# Patient Record
Sex: MALE | Race: WHITE | Employment: OTHER | URBAN - METROPOLITAN AREA
[De-identification: names, ages, dates, MRNs, and addresses within clinical notes are randomized per-mention and may not be internally consistent; named-entity substitution may affect disease eponyms.]

---

## 2021-09-09 NOTE — PERIOP NOTES
PATIENT CALLED AND MADE AWARE OF COVID-19 TESTING NEEDED TO BE DONE WITHIN 96 HOURS OF SURGERY. PATIENT INSTRUCTED ON SELF QUARANTINE BETWEEN TESTING AND ARRIVAL TIME DAY OF SURGERY. PT ALSO INFORMED TO NOT USE ANY NASAL SPRAYS OR NASAL OINTMENTS PRIOR TO NASAL SWAB.     SPOKE W/ PT. HE WILL HAVE TEST DONE LOCAL TO HIS HOME AND WILL FAX RESULTS TO [facility]   (FAX NUMBERS PROVIDED)

## 2021-09-10 NOTE — H&P
Rashida Morse  : 1955   / Language: English / Race: White  Male      History of Present Illness   The patient is a 62year old male who presents to the practice today for a transition into care. Additional reasons for visit:    Hip Pain is described as the following: The onset of the hip pain has been gradual and has been occurring in a persistent pattern for 6 years. The course has been gradually worsening. The hip pain is described as a severe dull aching. The hip pain is described as being located in the hip (right). The pain is aggravated by walking, prolonged standing, bending and squatting. Relieving factors include rest, heat and elevation of leg. Note for \"Hip Pain\": Patient's chief complaint is right hip pain. Over the last several months he has had increasing symptoms. Last month he had a shot of cortisone which made his symptoms worse. X-rays show bone-on-bone osteoarthritis of the right hip. He is now having problems getting his shoes and socks on and doing his job. 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 Pain has worsened to the point that he is limping all the time. Referred in today for opinion regarding hip replacement. Allergies  No Known Allergies   [09/10/2020]:  No Known Drug Allergies   [09/10/2020]: Allergies Reconciled      Social History   Alcohol use   1-2 drinks per occasion, 2 times, Drinks beer, Drinks hard liquor, per year, Rarely drinks more than 5 drinks per occasion. Caffeine use   1-2 drinks per day, Carbonated beverages, Coffee, Tea. Current work status   Full-time. Exercise   Occasionally, walking. Marital status   . No drug use    Seat Belt Use   Always uses seat belts. Sun Exposure   Frequently. Tobacco / smoke exposure    Tobacco use   Former smoker, Smokes 2 packs of cigarettes per day. Medication History  Metoprolol Tartrate  (Oral) Specific strength unknown - Active.   Crestor  (Oral) Specific strength unknown - Active. Dilshad  (Oral) Specific strength unknown - Active. Ibuprofen  (200MG Tablet, Oral) Active. Aspirin Low Strength  (81MG Tablet Chewable, Oral) Active. Omeprazole  (10MG Capsule DR, Oral) Active. Medications Reconciled     Past Surgical History   No pertinent past surgical history          Review of Systems   General Not Present- Appetite Loss, Chills, Fatigue, Fever, HIV Exposure, Night Sweats, Persistent Infections, Seasonal Allergies, Weight Gain and Weight Loss. Skin Not Present- Bruising, Itching, Nail Changes, Pallor, Poor Wound Healing, Rash, Skin Color Changes, Suspicious Lesions and Yellowish Skin Color. HEENT Present- Ringing in the Ears. Not Present- Decreased Hearing, Double Vision, Earache, Hoarseness, Jaundice/Yellow Eyes, Loose Teeth, Nose Bleed and Sore Throat. Respiratory Not Present- Bloody sputum, Chronic Cough, Cough, Difficulty Breathing, Snoring, Wakes up from Sleep Wheezing or Short of Breath and Wheezing. Cardiovascular Not Present- Bluish Discoloration Of Lips Or Nails, Chest Pain, Difficulty Breathing Lying Down, Difficulty Breathing On Exertion, Fainting / Blacking Out, Leg Cramps With Exertion, Palpitations, Rapid Heart Rate and Swelling of Extremities. Gastrointestinal Not Present- Abdominal Pain, Black, Tarry Stool, Change in Bowel Habits, Cirrhosis, Constipation, Diarrhea, Difficulty Swallowing, Nausea and Vomiting. Male Genitourinary Not Present- Blood in Urine, Frequency, Painful Urination, Pelvic Pain, Trouble Starting Urinary Stream and Urgency. Musculoskeletal Present- Back Pain and Joint Pain. Not Present- Decreased Range of Motion, Joint Stiffness and Joint Swelling. Neurological Not Present- Dysesthesia, Fainting, Headaches, Memory Loss, Numbness, Paresthesias, Seizures, Tingling, Tremor, Unsteadiness, Weakness and Weakness In Extremities. Psychiatric Not Present- Anxiety, Bipolar and Depression.   Endocrine Not Present- Cold Intolerance, Excessive Hunger, Excessive Thirst, Excessive Urination and Heat Intolerance. Hematology Not Present- Abnormal Bleeding, Abnormal Bruising , Blood Clots, Enlarged Lymph Nodes, Excessive bleeding, Petechiae and Skin Discoloration. Vitals  Weight: 230 lb   Height: 73 in   Weight was reported by patient. Height was reported by patient. Body Surface Area: 2.28 m²   Body Mass Index: 30.34 kg/m²      Physical Exam  Musculoskeletal  Global Assessment  Examination of related systems reveals - well-developed, well-nourished, in no acute distress, alert and oriented x 3. Right Lower Extremity - Note: Patient's hip was examined. Severe pain on internal rotation. Leg lengths are equal. Logroll test impingement test both positive. Straight leg raise and femoral nerve stretch test are negative. Extremity is sensate and perfused with palpable dorsalis pedis pulse. Hip flexors, quads, ankle plantar flexors, ankle dorsiflexors have 5 out of 5 strength. Left Lower Extremity - Note: Patient's hip was examined. Impingement and apprehension signs are negative. Hip extends fully. Hip flexes to 100+ degrees. There is no trochanteric tenderness. Straight leg raise and femoral nerve stretch test are negative. Extremity is sensate and perfused with palpable dorsalis pedis pulse. Hip flexors, quads, ankle plantar flexors, ankle dorsiflexors have 5 out of 5 strength. Assessment & Plan     Primary osteoarthritis of right hip (715.15  M16.11)  Impression: Patient has end-stage DJD of his right hip. X-rays today revealed bone-on-bone with subchondral cyst formation and osteophyte formation. He has trialed a shot of cortisone which actually made him worse. At this point were going to schedule him for an direct anterior right total hip with 1 night stay in the hospital due to him being from out of town. All questions were answered.     Current Plans  Pt Education - How to Access Health Information Online using Patient Portal and 3rd Party Apps: discussed with patient and provided information. Pt Education - Educational materials were provided.: discussed with patient and provided information. Started Mobic 15 MG Oral Tablet, 1 (one) Tablet daily, #30, 09/10/2020, No Refill.     REVIEW OF SYSTEMS: Systems were reviewed by the provider.(V49.9)

## 2021-09-16 ENCOUNTER — ANESTHESIA EVENT (OUTPATIENT)
Dept: SURGERY | Age: 66
End: 2021-09-16
Payer: COMMERCIAL

## 2021-09-16 NOTE — PERIOP NOTES
CALLED TO FOLLOW UP ON COVID TEST RESULTS. LEFT  NOTIFYING THAT WE HAVE NOT RECEIVED COVID TEST RESULTS AND FOR HIM TO BRING THEM WITH HIM TOMORROW IF HE HAS THE RESULTS. 16:00 PATIENT CALLED BACK AND STATED HE HAS COPY OF COVID RESULTS AND HE WILL BRING THEM TOMORROW.

## 2021-09-17 ENCOUNTER — HOSPITAL ENCOUNTER (OUTPATIENT)
Age: 66
Discharge: HOME HEALTH | End: 2021-09-19
Attending: ORTHOPAEDIC SURGERY | Admitting: ORTHOPAEDIC SURGERY
Payer: COMMERCIAL

## 2021-09-17 ENCOUNTER — APPOINTMENT (OUTPATIENT)
Dept: GENERAL RADIOLOGY | Age: 66
End: 2021-09-17
Attending: ORTHOPAEDIC SURGERY
Payer: COMMERCIAL

## 2021-09-17 ENCOUNTER — ANESTHESIA (OUTPATIENT)
Dept: SURGERY | Age: 66
End: 2021-09-17
Payer: COMMERCIAL

## 2021-09-17 DIAGNOSIS — M16.11 PRIMARY LOCALIZED OSTEOARTHRITIS OF RIGHT HIP: Primary | ICD-10-CM

## 2021-09-17 LAB
ABO + RH BLD: NORMAL
BLOOD GROUP ANTIBODIES SERPL: NORMAL
GLUCOSE BLD STRIP.AUTO-MCNC: 118 MG/DL (ref 65–117)
GLUCOSE BLD STRIP.AUTO-MCNC: 128 MG/DL (ref 65–117)
GLUCOSE BLD STRIP.AUTO-MCNC: 137 MG/DL (ref 65–117)
SERVICE CMNT-IMP: ABNORMAL
SPECIMEN EXP DATE BLD: NORMAL

## 2021-09-17 PROCEDURE — 77030039267 HC ADH SKN EXOFIN S2SG -B: Performed by: ORTHOPAEDIC SURGERY

## 2021-09-17 PROCEDURE — 74011250636 HC RX REV CODE- 250/636: Performed by: PHYSICIAN ASSISTANT

## 2021-09-17 PROCEDURE — 73501 X-RAY EXAM HIP UNI 1 VIEW: CPT

## 2021-09-17 PROCEDURE — 74011000250 HC RX REV CODE- 250: Performed by: ORTHOPAEDIC SURGERY

## 2021-09-17 PROCEDURE — 77030006822 HC BLD SAW SAG BRSM -B: Performed by: ORTHOPAEDIC SURGERY

## 2021-09-17 PROCEDURE — C1776 JOINT DEVICE (IMPLANTABLE): HCPCS | Performed by: ORTHOPAEDIC SURGERY

## 2021-09-17 PROCEDURE — 74011000258 HC RX REV CODE- 258: Performed by: ORTHOPAEDIC SURGERY

## 2021-09-17 PROCEDURE — 74011250636 HC RX REV CODE- 250/636

## 2021-09-17 PROCEDURE — 76210000016 HC OR PH I REC 1 TO 1.5 HR: Performed by: ORTHOPAEDIC SURGERY

## 2021-09-17 PROCEDURE — 74011250637 HC RX REV CODE- 250/637: Performed by: PHYSICIAN ASSISTANT

## 2021-09-17 PROCEDURE — 2709999900 HC NON-CHARGEABLE SUPPLY: Performed by: ORTHOPAEDIC SURGERY

## 2021-09-17 PROCEDURE — 36415 COLL VENOUS BLD VENIPUNCTURE: CPT

## 2021-09-17 PROCEDURE — 77030026438 HC STYL ET INTUB CARD -A: Performed by: ANESTHESIOLOGY

## 2021-09-17 PROCEDURE — C9290 INJ, BUPIVACAINE LIPOSOME: HCPCS | Performed by: ORTHOPAEDIC SURGERY

## 2021-09-17 PROCEDURE — 74011250637 HC RX REV CODE- 250/637: Performed by: STUDENT IN AN ORGANIZED HEALTH CARE EDUCATION/TRAINING PROGRAM

## 2021-09-17 PROCEDURE — 76060000037 HC ANESTHESIA 3 TO 3.5 HR: Performed by: ORTHOPAEDIC SURGERY

## 2021-09-17 PROCEDURE — 77030008684 HC TU ET CUF COVD -B: Performed by: ANESTHESIOLOGY

## 2021-09-17 PROCEDURE — 77030002933 HC SUT MCRYL J&J -A: Performed by: ORTHOPAEDIC SURGERY

## 2021-09-17 PROCEDURE — 74011250637 HC RX REV CODE- 250/637: Performed by: ANESTHESIOLOGY

## 2021-09-17 PROCEDURE — 82962 GLUCOSE BLOOD TEST: CPT

## 2021-09-17 PROCEDURE — 74011000250 HC RX REV CODE- 250: Performed by: NURSE ANESTHETIST, CERTIFIED REGISTERED

## 2021-09-17 PROCEDURE — 74011250636 HC RX REV CODE- 250/636: Performed by: ORTHOPAEDIC SURGERY

## 2021-09-17 PROCEDURE — 74011000250 HC RX REV CODE- 250

## 2021-09-17 PROCEDURE — 74011250636 HC RX REV CODE- 250/636: Performed by: ANESTHESIOLOGY

## 2021-09-17 PROCEDURE — 77030031139 HC SUT VCRL2 J&J -A: Performed by: ORTHOPAEDIC SURGERY

## 2021-09-17 PROCEDURE — 76010000172 HC OR TIME 2.5 TO 3 HR INTENSV-TIER 1: Performed by: ORTHOPAEDIC SURGERY

## 2021-09-17 PROCEDURE — 77030020788: Performed by: ORTHOPAEDIC SURGERY

## 2021-09-17 PROCEDURE — 74011000250 HC RX REV CODE- 250: Performed by: PHYSICIAN ASSISTANT

## 2021-09-17 PROCEDURE — P9045 ALBUMIN (HUMAN), 5%, 250 ML: HCPCS

## 2021-09-17 PROCEDURE — 77030013079 HC BLNKT BAIR HGGR 3M -A: Performed by: ANESTHESIOLOGY

## 2021-09-17 PROCEDURE — 86901 BLOOD TYPING SEROLOGIC RH(D): CPT

## 2021-09-17 PROCEDURE — P9045 ALBUMIN (HUMAN), 5%, 250 ML: HCPCS | Performed by: NURSE ANESTHETIST, CERTIFIED REGISTERED

## 2021-09-17 PROCEDURE — 74011250636 HC RX REV CODE- 250/636: Performed by: NURSE ANESTHETIST, CERTIFIED REGISTERED

## 2021-09-17 DEVICE — BIOLOX DELTA CERAMIC FEMORAL HEAD +8.5 36MM DIA 12/14 TAPER
Type: IMPLANTABLE DEVICE | Site: HIP | Status: FUNCTIONAL
Brand: BIOLOX DELTA

## 2021-09-17 DEVICE — PINNACLE GRIPTION ACETABULAR SHELL SECTOR 56MM OD
Type: IMPLANTABLE DEVICE | Site: HIP | Status: FUNCTIONAL
Brand: PINNACLE GRIPTION

## 2021-09-17 DEVICE — APEX HOLE ELIMINATOR - PS
Type: IMPLANTABLE DEVICE | Site: HIP | Status: FUNCTIONAL
Brand: APEX

## 2021-09-17 DEVICE — ACTIS DUOFIX HIP PROSTHESIS (FEMORAL STEM 12/14 TAPER CEMENTLESS SIZE 8 HIGH COLLAR)  CE
Type: IMPLANTABLE DEVICE | Site: HIP | Status: FUNCTIONAL
Brand: ACTIS

## 2021-09-17 DEVICE — PINNACLE CANCELLOUS BONE SCREW 6.5MM X 50MM
Type: IMPLANTABLE DEVICE | Site: HIP | Status: FUNCTIONAL
Brand: PINNACLE

## 2021-09-17 DEVICE — PINNACLE CANCELLOUS BONE SCREW 6.5MM X 15MM
Type: IMPLANTABLE DEVICE | Site: HIP | Status: FUNCTIONAL
Brand: PINNACLE

## 2021-09-17 DEVICE — PINNACLE HIP SOLUTIONS ALTRX POLYETHYLENE ACETABULAR LINER NEUTRAL 36MM ID 56MM OD
Type: IMPLANTABLE DEVICE | Site: HIP | Status: FUNCTIONAL
Brand: PINNACLE ALTRX

## 2021-09-17 DEVICE — HIP H2 TOT ADV OTHER HD IMPL CAPPED SYNTHES: Type: IMPLANTABLE DEVICE | Status: FUNCTIONAL

## 2021-09-17 RX ORDER — ASPIRIN 81 MG/1
81 TABLET ORAL DAILY
Status: DISCONTINUED | OUTPATIENT
Start: 2021-09-18 | End: 2021-09-19 | Stop reason: HOSPADM

## 2021-09-17 RX ORDER — CALCIUM CARBONATE 200(500)MG
200 TABLET,CHEWABLE ORAL AS NEEDED
Status: DISCONTINUED | OUTPATIENT
Start: 2021-09-17 | End: 2021-09-19 | Stop reason: HOSPADM

## 2021-09-17 RX ORDER — FENTANYL CITRATE 50 UG/ML
25 INJECTION, SOLUTION INTRAMUSCULAR; INTRAVENOUS
Status: DISCONTINUED | OUTPATIENT
Start: 2021-09-17 | End: 2021-09-17 | Stop reason: HOSPADM

## 2021-09-17 RX ORDER — ROSUVASTATIN CALCIUM 10 MG/1
10 TABLET, COATED ORAL
Status: ON HOLD | COMMUNITY
End: 2021-09-17

## 2021-09-17 RX ORDER — METOPROLOL SUCCINATE 50 MG/1
100 TABLET, EXTENDED RELEASE ORAL DAILY
Status: DISCONTINUED | OUTPATIENT
Start: 2021-09-18 | End: 2021-09-18

## 2021-09-17 RX ORDER — AMOXICILLIN 250 MG
1 CAPSULE ORAL 2 TIMES DAILY
Status: DISCONTINUED | OUTPATIENT
Start: 2021-09-17 | End: 2021-09-19 | Stop reason: HOSPADM

## 2021-09-17 RX ORDER — DEXTROMETHORPHAN HYDROBROMIDE, GUAIFENESIN 5; 100 MG/5ML; MG/5ML
650 LIQUID ORAL EVERY 8 HOURS
COMMUNITY

## 2021-09-17 RX ORDER — AMLODIPINE AND OLMESARTAN MEDOXOMIL 10; 20 MG/1; MG/1
TABLET ORAL
Status: ON HOLD | COMMUNITY
End: 2021-09-17

## 2021-09-17 RX ORDER — AMOXICILLIN 250 MG
1 CAPSULE ORAL DAILY
Qty: 40 TABLET | Refills: 0 | Status: SHIPPED | OUTPATIENT
Start: 2021-09-17

## 2021-09-17 RX ORDER — ROCURONIUM BROMIDE 10 MG/ML
INJECTION, SOLUTION INTRAVENOUS AS NEEDED
Status: DISCONTINUED | OUTPATIENT
Start: 2021-09-17 | End: 2021-09-17 | Stop reason: HOSPADM

## 2021-09-17 RX ORDER — PROPOFOL 10 MG/ML
INJECTION, EMULSION INTRAVENOUS AS NEEDED
Status: DISCONTINUED | OUTPATIENT
Start: 2021-09-17 | End: 2021-09-17 | Stop reason: HOSPADM

## 2021-09-17 RX ORDER — NALOXONE HYDROCHLORIDE 4 MG/.1ML
SPRAY NASAL
Qty: 1 EACH | Refills: 0 | Status: SHIPPED | OUTPATIENT
Start: 2021-09-17

## 2021-09-17 RX ORDER — POLYETHYLENE GLYCOL 3350 17 G/17G
17 POWDER, FOR SOLUTION ORAL DAILY
Status: DISCONTINUED | OUTPATIENT
Start: 2021-09-18 | End: 2021-09-19 | Stop reason: HOSPADM

## 2021-09-17 RX ORDER — METOPROLOL SUCCINATE 100 MG/1
100 TABLET, EXTENDED RELEASE ORAL DAILY
COMMUNITY

## 2021-09-17 RX ORDER — LORAZEPAM 0.5 MG/1
0.5 TABLET ORAL
Status: DISCONTINUED | OUTPATIENT
Start: 2021-09-17 | End: 2021-09-19 | Stop reason: HOSPADM

## 2021-09-17 RX ORDER — METOPROLOL TARTRATE 25 MG/1
TABLET, FILM COATED ORAL 2 TIMES DAILY
Status: ON HOLD | COMMUNITY
End: 2021-09-17

## 2021-09-17 RX ORDER — SUCCINYLCHOLINE CHLORIDE 20 MG/ML
INJECTION INTRAMUSCULAR; INTRAVENOUS AS NEEDED
Status: DISCONTINUED | OUTPATIENT
Start: 2021-09-17 | End: 2021-09-17 | Stop reason: HOSPADM

## 2021-09-17 RX ORDER — LOSARTAN POTASSIUM 50 MG/1
100 TABLET ORAL DAILY
Status: DISCONTINUED | OUTPATIENT
Start: 2021-09-18 | End: 2021-09-19 | Stop reason: HOSPADM

## 2021-09-17 RX ORDER — MIDAZOLAM HYDROCHLORIDE 1 MG/ML
1 INJECTION, SOLUTION INTRAMUSCULAR; INTRAVENOUS AS NEEDED
Status: DISCONTINUED | OUTPATIENT
Start: 2021-09-17 | End: 2021-09-17 | Stop reason: HOSPADM

## 2021-09-17 RX ORDER — HYDROMORPHONE HYDROCHLORIDE 1 MG/ML
0.5 INJECTION, SOLUTION INTRAMUSCULAR; INTRAVENOUS; SUBCUTANEOUS
Status: ACTIVE | OUTPATIENT
Start: 2021-09-17 | End: 2021-09-18

## 2021-09-17 RX ORDER — SODIUM CHLORIDE, SODIUM LACTATE, POTASSIUM CHLORIDE, CALCIUM CHLORIDE 600; 310; 30; 20 MG/100ML; MG/100ML; MG/100ML; MG/100ML
50 INJECTION, SOLUTION INTRAVENOUS CONTINUOUS
Status: DISCONTINUED | OUTPATIENT
Start: 2021-09-17 | End: 2021-09-17 | Stop reason: HOSPADM

## 2021-09-17 RX ORDER — HYDROMORPHONE HYDROCHLORIDE 2 MG/ML
INJECTION, SOLUTION INTRAMUSCULAR; INTRAVENOUS; SUBCUTANEOUS AS NEEDED
Status: DISCONTINUED | OUTPATIENT
Start: 2021-09-17 | End: 2021-09-17 | Stop reason: HOSPADM

## 2021-09-17 RX ORDER — CLOPIDOGREL BISULFATE 75 MG/1
TABLET ORAL
COMMUNITY

## 2021-09-17 RX ORDER — HYDROMORPHONE HYDROCHLORIDE 1 MG/ML
0.2 INJECTION, SOLUTION INTRAMUSCULAR; INTRAVENOUS; SUBCUTANEOUS
Status: DISCONTINUED | OUTPATIENT
Start: 2021-09-17 | End: 2021-09-17 | Stop reason: HOSPADM

## 2021-09-17 RX ORDER — ONDANSETRON 2 MG/ML
4 INJECTION INTRAMUSCULAR; INTRAVENOUS AS NEEDED
Status: DISCONTINUED | OUTPATIENT
Start: 2021-09-17 | End: 2021-09-17 | Stop reason: HOSPADM

## 2021-09-17 RX ORDER — ALBUMIN HUMAN 50 G/1000ML
12.5 SOLUTION INTRAVENOUS ONCE
Status: COMPLETED | OUTPATIENT
Start: 2021-09-17 | End: 2021-09-17

## 2021-09-17 RX ORDER — BISMUTH SUBSALICYLATE 262 MG
1 TABLET,CHEWABLE ORAL DAILY
COMMUNITY

## 2021-09-17 RX ORDER — NALOXONE HYDROCHLORIDE 0.4 MG/ML
0.4 INJECTION, SOLUTION INTRAMUSCULAR; INTRAVENOUS; SUBCUTANEOUS AS NEEDED
Status: DISCONTINUED | OUTPATIENT
Start: 2021-09-17 | End: 2021-09-19 | Stop reason: HOSPADM

## 2021-09-17 RX ORDER — EPHEDRINE SULFATE/0.9% NACL/PF 50 MG/5 ML
SYRINGE (ML) INTRAVENOUS AS NEEDED
Status: DISCONTINUED | OUTPATIENT
Start: 2021-09-17 | End: 2021-09-17 | Stop reason: HOSPADM

## 2021-09-17 RX ORDER — FENTANYL CITRATE 50 UG/ML
INJECTION, SOLUTION INTRAMUSCULAR; INTRAVENOUS AS NEEDED
Status: DISCONTINUED | OUTPATIENT
Start: 2021-09-17 | End: 2021-09-17 | Stop reason: HOSPADM

## 2021-09-17 RX ORDER — SODIUM CHLORIDE 0.9 % (FLUSH) 0.9 %
5-40 SYRINGE (ML) INJECTION EVERY 8 HOURS
Status: DISCONTINUED | OUTPATIENT
Start: 2021-09-17 | End: 2021-09-19 | Stop reason: HOSPADM

## 2021-09-17 RX ORDER — ASPIRIN 81 MG/1
81 TABLET ORAL DAILY
COMMUNITY

## 2021-09-17 RX ORDER — FAMOTIDINE 20 MG/1
10 TABLET, FILM COATED ORAL 2 TIMES DAILY
Status: DISCONTINUED | OUTPATIENT
Start: 2021-09-17 | End: 2021-09-19 | Stop reason: HOSPADM

## 2021-09-17 RX ORDER — OXYCODONE HYDROCHLORIDE 5 MG/1
5 TABLET ORAL
Qty: 42 TABLET | Refills: 0 | Status: SHIPPED | OUTPATIENT
Start: 2021-09-17 | End: 2021-09-24

## 2021-09-17 RX ORDER — HYDROCHLOROTHIAZIDE 25 MG/1
12.5 TABLET ORAL DAILY
Status: DISCONTINUED | OUTPATIENT
Start: 2021-09-18 | End: 2021-09-19 | Stop reason: HOSPADM

## 2021-09-17 RX ORDER — FAMOTIDINE 10 MG/1
10 TABLET ORAL 2 TIMES DAILY
COMMUNITY

## 2021-09-17 RX ORDER — HYDROXYZINE HYDROCHLORIDE 10 MG/1
10 TABLET, FILM COATED ORAL
Status: DISCONTINUED | OUTPATIENT
Start: 2021-09-17 | End: 2021-09-19 | Stop reason: HOSPADM

## 2021-09-17 RX ORDER — ACETAMINOPHEN 325 MG/1
650 TABLET ORAL ONCE
Status: COMPLETED | OUTPATIENT
Start: 2021-09-17 | End: 2021-09-17

## 2021-09-17 RX ORDER — CLOPIDOGREL BISULFATE 75 MG/1
75 TABLET ORAL DAILY
Status: DISCONTINUED | OUTPATIENT
Start: 2021-09-18 | End: 2021-09-19 | Stop reason: HOSPADM

## 2021-09-17 RX ORDER — AMLODIPINE BESYLATE 5 MG/1
10 TABLET ORAL DAILY
Status: DISCONTINUED | OUTPATIENT
Start: 2021-09-18 | End: 2021-09-19 | Stop reason: HOSPADM

## 2021-09-17 RX ORDER — EPHEDRINE SULFATE/0.9% NACL/PF 50 MG/5 ML
SYRINGE (ML) INTRAVENOUS
Status: COMPLETED
Start: 2021-09-17 | End: 2021-09-17

## 2021-09-17 RX ORDER — ROSUVASTATIN CALCIUM 40 MG/1
40 TABLET, COATED ORAL
Status: DISCONTINUED | OUTPATIENT
Start: 2021-09-17 | End: 2021-09-19 | Stop reason: HOSPADM

## 2021-09-17 RX ORDER — LIDOCAINE HYDROCHLORIDE 20 MG/ML
INJECTION, SOLUTION EPIDURAL; INFILTRATION; INTRACAUDAL; PERINEURAL AS NEEDED
Status: DISCONTINUED | OUTPATIENT
Start: 2021-09-17 | End: 2021-09-17 | Stop reason: HOSPADM

## 2021-09-17 RX ORDER — DEXAMETHASONE SODIUM PHOSPHATE 4 MG/ML
INJECTION, SOLUTION INTRA-ARTICULAR; INTRALESIONAL; INTRAMUSCULAR; INTRAVENOUS; SOFT TISSUE AS NEEDED
Status: DISCONTINUED | OUTPATIENT
Start: 2021-09-17 | End: 2021-09-17 | Stop reason: HOSPADM

## 2021-09-17 RX ORDER — ACETAMINOPHEN 500 MG
1000 TABLET ORAL EVERY 6 HOURS
Status: DISCONTINUED | OUTPATIENT
Start: 2021-09-17 | End: 2021-09-19 | Stop reason: HOSPADM

## 2021-09-17 RX ORDER — HYDROCHLOROTHIAZIDE 12.5 MG/1
12.5 CAPSULE ORAL DAILY
COMMUNITY

## 2021-09-17 RX ORDER — FACIAL-BODY WIPES
10 EACH TOPICAL DAILY PRN
Status: DISCONTINUED | OUTPATIENT
Start: 2021-09-19 | End: 2021-09-19 | Stop reason: HOSPADM

## 2021-09-17 RX ORDER — METAXALONE 400 MG/1
TABLET ORAL
COMMUNITY

## 2021-09-17 RX ORDER — GLUCOSAMINE SULFATE 1500 MG
POWDER IN PACKET (EA) ORAL DAILY
COMMUNITY

## 2021-09-17 RX ORDER — ALBUMIN HUMAN 50 G/1000ML
SOLUTION INTRAVENOUS AS NEEDED
Status: DISCONTINUED | OUTPATIENT
Start: 2021-09-17 | End: 2021-09-17 | Stop reason: HOSPADM

## 2021-09-17 RX ORDER — FENTANYL CITRATE 50 UG/ML
50 INJECTION, SOLUTION INTRAMUSCULAR; INTRAVENOUS AS NEEDED
Status: DISCONTINUED | OUTPATIENT
Start: 2021-09-17 | End: 2021-09-17 | Stop reason: HOSPADM

## 2021-09-17 RX ORDER — EPHEDRINE SULFATE/0.9% NACL/PF 50 MG/5 ML
10 SYRINGE (ML) INTRAVENOUS ONCE
Status: COMPLETED | OUTPATIENT
Start: 2021-09-17 | End: 2021-09-17

## 2021-09-17 RX ORDER — LORAZEPAM 0.5 MG/1
TABLET ORAL
COMMUNITY

## 2021-09-17 RX ORDER — ALBUMIN HUMAN 50 G/1000ML
SOLUTION INTRAVENOUS
Status: COMPLETED
Start: 2021-09-17 | End: 2021-09-17

## 2021-09-17 RX ORDER — ONDANSETRON 2 MG/ML
4 INJECTION INTRAMUSCULAR; INTRAVENOUS
Status: DISPENSED | OUTPATIENT
Start: 2021-09-17 | End: 2021-09-19

## 2021-09-17 RX ORDER — ONDANSETRON 2 MG/ML
INJECTION INTRAMUSCULAR; INTRAVENOUS AS NEEDED
Status: DISCONTINUED | OUTPATIENT
Start: 2021-09-17 | End: 2021-09-17 | Stop reason: HOSPADM

## 2021-09-17 RX ORDER — SODIUM CHLORIDE 0.9 % (FLUSH) 0.9 %
5-40 SYRINGE (ML) INJECTION EVERY 8 HOURS
Status: DISCONTINUED | OUTPATIENT
Start: 2021-09-17 | End: 2021-09-17 | Stop reason: HOSPADM

## 2021-09-17 RX ORDER — SODIUM CHLORIDE 0.9 % (FLUSH) 0.9 %
5-40 SYRINGE (ML) INJECTION AS NEEDED
Status: DISCONTINUED | OUTPATIENT
Start: 2021-09-17 | End: 2021-09-19 | Stop reason: HOSPADM

## 2021-09-17 RX ORDER — CHOLECALCIFEROL (VITAMIN D3) 50 MCG
CAPSULE ORAL
COMMUNITY

## 2021-09-17 RX ORDER — OXYCODONE HYDROCHLORIDE 5 MG/1
10 TABLET ORAL
Status: DISCONTINUED | OUTPATIENT
Start: 2021-09-17 | End: 2021-09-19 | Stop reason: HOSPADM

## 2021-09-17 RX ORDER — PHENYLEPHRINE HCL IN 0.9% NACL 0.4MG/10ML
SYRINGE (ML) INTRAVENOUS AS NEEDED
Status: DISCONTINUED | OUTPATIENT
Start: 2021-09-17 | End: 2021-09-17 | Stop reason: HOSPADM

## 2021-09-17 RX ORDER — OXYCODONE HYDROCHLORIDE 5 MG/1
5 TABLET ORAL
Status: DISCONTINUED | OUTPATIENT
Start: 2021-09-17 | End: 2021-09-19 | Stop reason: HOSPADM

## 2021-09-17 RX ORDER — ROSUVASTATIN CALCIUM 40 MG/1
40 TABLET, COATED ORAL
COMMUNITY

## 2021-09-17 RX ORDER — PROPOFOL 10 MG/ML
INJECTION, EMULSION INTRAVENOUS
Status: DISCONTINUED | OUTPATIENT
Start: 2021-09-17 | End: 2021-09-17 | Stop reason: HOSPADM

## 2021-09-17 RX ORDER — SODIUM CHLORIDE 0.9 % (FLUSH) 0.9 %
5-40 SYRINGE (ML) INJECTION AS NEEDED
Status: DISCONTINUED | OUTPATIENT
Start: 2021-09-17 | End: 2021-09-17 | Stop reason: HOSPADM

## 2021-09-17 RX ORDER — TRANEXAMIC ACID 100 MG/ML
INJECTION, SOLUTION INTRAVENOUS AS NEEDED
Status: DISCONTINUED | OUTPATIENT
Start: 2021-09-17 | End: 2021-09-17 | Stop reason: HOSPADM

## 2021-09-17 RX ORDER — MIDAZOLAM HYDROCHLORIDE 1 MG/ML
INJECTION, SOLUTION INTRAMUSCULAR; INTRAVENOUS AS NEEDED
Status: DISCONTINUED | OUTPATIENT
Start: 2021-09-17 | End: 2021-09-17 | Stop reason: HOSPADM

## 2021-09-17 RX ORDER — AMLODIPINE AND OLMESARTAN MEDOXOMIL 10; 40 MG/1; MG/1
1 TABLET ORAL DAILY
COMMUNITY

## 2021-09-17 RX ORDER — LIDOCAINE HYDROCHLORIDE 10 MG/ML
0.1 INJECTION, SOLUTION EPIDURAL; INFILTRATION; INTRACAUDAL; PERINEURAL AS NEEDED
Status: DISCONTINUED | OUTPATIENT
Start: 2021-09-17 | End: 2021-09-17 | Stop reason: HOSPADM

## 2021-09-17 RX ORDER — SODIUM CHLORIDE 9 MG/ML
125 INJECTION, SOLUTION INTRAVENOUS CONTINUOUS
Status: DISPENSED | OUTPATIENT
Start: 2021-09-17 | End: 2021-09-18

## 2021-09-17 RX ADMIN — MIDAZOLAM HYDROCHLORIDE 2 MG: 1 INJECTION, SOLUTION INTRAMUSCULAR; INTRAVENOUS at 08:50

## 2021-09-17 RX ADMIN — PROPOFOL 100 MG: 10 INJECTION, EMULSION INTRAVENOUS at 08:57

## 2021-09-17 RX ADMIN — ALBUMIN (HUMAN) 250 ML: 12.5 INJECTION, SOLUTION INTRAVENOUS at 10:14

## 2021-09-17 RX ADMIN — ALBUMIN (HUMAN) 12.5 G: 12.5 INJECTION, SOLUTION INTRAVENOUS at 12:40

## 2021-09-17 RX ADMIN — SODIUM CHLORIDE, POTASSIUM CHLORIDE, SODIUM LACTATE AND CALCIUM CHLORIDE 50 ML/HR: 600; 310; 30; 20 INJECTION, SOLUTION INTRAVENOUS at 07:43

## 2021-09-17 RX ADMIN — DEXAMETHASONE SODIUM PHOSPHATE 4 MG: 4 INJECTION, SOLUTION INTRAMUSCULAR; INTRAVENOUS at 09:43

## 2021-09-17 RX ADMIN — HYDROMORPHONE HYDROCHLORIDE 0.5 MG: 2 INJECTION, SOLUTION INTRAMUSCULAR; INTRAVENOUS; SUBCUTANEOUS at 11:17

## 2021-09-17 RX ADMIN — SODIUM CHLORIDE 125 ML/HR: 900 INJECTION, SOLUTION INTRAVENOUS at 12:30

## 2021-09-17 RX ADMIN — ACETAMINOPHEN 650 MG: 325 TABLET ORAL at 08:05

## 2021-09-17 RX ADMIN — SUGAMMADEX 210 MG: 100 INJECTION, SOLUTION INTRAVENOUS at 11:36

## 2021-09-17 RX ADMIN — Medication 10 MG: at 12:36

## 2021-09-17 RX ADMIN — ONDANSETRON 4 MG: 2 INJECTION INTRAMUSCULAR; INTRAVENOUS at 15:18

## 2021-09-17 RX ADMIN — HYDROMORPHONE HYDROCHLORIDE 0.5 MG: 2 INJECTION, SOLUTION INTRAMUSCULAR; INTRAVENOUS; SUBCUTANEOUS at 11:07

## 2021-09-17 RX ADMIN — LIDOCAINE HYDROCHLORIDE 80 MG: 20 INJECTION, SOLUTION EPIDURAL; INFILTRATION; INTRACAUDAL; PERINEURAL at 08:57

## 2021-09-17 RX ADMIN — ONDANSETRON HYDROCHLORIDE 4 MG: 2 INJECTION, SOLUTION INTRAMUSCULAR; INTRAVENOUS at 11:23

## 2021-09-17 RX ADMIN — ALBUMIN HUMAN 12.5 G: 50 SOLUTION INTRAVENOUS at 12:40

## 2021-09-17 RX ADMIN — HYDROMORPHONE HYDROCHLORIDE 0.5 MG: 2 INJECTION, SOLUTION INTRAMUSCULAR; INTRAVENOUS; SUBCUTANEOUS at 11:51

## 2021-09-17 RX ADMIN — ONDANSETRON HYDROCHLORIDE 4 MG: 2 INJECTION, SOLUTION INTRAMUSCULAR; INTRAVENOUS at 09:43

## 2021-09-17 RX ADMIN — PROPOFOL 25 MCG/KG/MIN: 10 INJECTION, EMULSION INTRAVENOUS at 09:37

## 2021-09-17 RX ADMIN — Medication 80 MCG: at 09:10

## 2021-09-17 RX ADMIN — WATER 2 G: 1 INJECTION INTRAMUSCULAR; INTRAVENOUS; SUBCUTANEOUS at 09:04

## 2021-09-17 RX ADMIN — SENNOSIDES AND DOCUSATE SODIUM 1 TABLET: 50; 8.6 TABLET ORAL at 17:27

## 2021-09-17 RX ADMIN — FENTANYL CITRATE 100 MCG: 50 INJECTION, SOLUTION INTRAMUSCULAR; INTRAVENOUS at 08:57

## 2021-09-17 RX ADMIN — HYDROMORPHONE HYDROCHLORIDE 0.2 MG: 1 INJECTION, SOLUTION INTRAMUSCULAR; INTRAVENOUS; SUBCUTANEOUS at 13:05

## 2021-09-17 RX ADMIN — HYDROMORPHONE HYDROCHLORIDE 0.2 MG: 1 INJECTION, SOLUTION INTRAMUSCULAR; INTRAVENOUS; SUBCUTANEOUS at 12:50

## 2021-09-17 RX ADMIN — MIDAZOLAM HYDROCHLORIDE 1 MG: 1 INJECTION, SOLUTION INTRAMUSCULAR; INTRAVENOUS at 08:53

## 2021-09-17 RX ADMIN — HYDROMORPHONE HYDROCHLORIDE 0.2 MG: 1 INJECTION, SOLUTION INTRAMUSCULAR; INTRAVENOUS; SUBCUTANEOUS at 12:30

## 2021-09-17 RX ADMIN — ROCURONIUM BROMIDE 45 MG: 10 SOLUTION INTRAVENOUS at 09:03

## 2021-09-17 RX ADMIN — ROCURONIUM BROMIDE 20 MG: 10 SOLUTION INTRAVENOUS at 09:53

## 2021-09-17 RX ADMIN — ACETAMINOPHEN 1000 MG: 500 TABLET ORAL at 14:44

## 2021-09-17 RX ADMIN — FAMOTIDINE 10 MG: 20 TABLET, FILM COATED ORAL at 17:27

## 2021-09-17 RX ADMIN — CALCIUM CARBONATE (ANTACID) CHEW TAB 500 MG 200 MG: 500 CHEW TAB at 19:38

## 2021-09-17 RX ADMIN — HYDROMORPHONE HYDROCHLORIDE 0.2 MG: 1 INJECTION, SOLUTION INTRAMUSCULAR; INTRAVENOUS; SUBCUTANEOUS at 12:40

## 2021-09-17 RX ADMIN — ROSUVASTATIN 40 MG: 40 TABLET, FILM COATED ORAL at 21:30

## 2021-09-17 RX ADMIN — ROCURONIUM BROMIDE 5 MG: 10 SOLUTION INTRAVENOUS at 08:57

## 2021-09-17 RX ADMIN — SUCCINYLCHOLINE CHLORIDE 160 MG: 20 INJECTION, SOLUTION INTRAMUSCULAR; INTRAVENOUS at 08:57

## 2021-09-17 RX ADMIN — Medication 10 MG: at 09:10

## 2021-09-17 RX ADMIN — MIDAZOLAM HYDROCHLORIDE 2 MG: 1 INJECTION, SOLUTION INTRAMUSCULAR; INTRAVENOUS at 08:48

## 2021-09-17 RX ADMIN — CEFAZOLIN SODIUM 2 G: 1 INJECTION, POWDER, FOR SOLUTION INTRAMUSCULAR; INTRAVENOUS at 17:26

## 2021-09-17 RX ADMIN — PHENYLEPHRINE HYDROCHLORIDE 80 MCG/MIN: 10 INJECTION INTRAVENOUS at 09:10

## 2021-09-17 RX ADMIN — ROCURONIUM BROMIDE 15 MG: 10 SOLUTION INTRAVENOUS at 10:30

## 2021-09-17 NOTE — OP NOTES
OPERATIVE REPORT  RIGHT TOTAL HIP REPLACEMENT (ANTERIOR APPROACH)    NAME: Alina Manzo  MRN: [id number]  :  1955  AGE: 60 y.o. DATE OF SURGERY:  2021    PREOPERATIVE DIAGNOSIS: Severe degenerative joint disease, right hip. POSTOPERATIVE DIAGNOSIS: Severe degenerative joint disease, right hip. PROCEDURES PERFORMED: Right total hip replacement - Anterior approach    SURGEON: Darwin Mejia MD.    ASSISTANT: Joshua Broussard PA-C    ANESTHESIA: General    ESTIMATED BLOOD LOSS: 125 mL. DRAINS: None. COMPLICATIONS: None. SPECIMENS REMOVED: None. PRE-OP ANTIBIOTIC: Ancef 2 gram    IMPLANT:   Implant Name Type Inv. Item Serial No.  Lot No. LRB No. Used Action   ELIMINATOR H APEX FOR 48-60MM PINN HIP SHELL - SNA  ELIMINATOR H APEX FOR 48-60MM PINN HIP SHELL NA [facility] [id number] Right 1 Implanted   CUP ACET [id number] HIP GRIPTION DONTA CEMENTLESS FIX SECT SER - SNA  CUP ACET [id number] HIP GRIPTION DONTA CEMENTLESS FIX SECT SER NA [facility] [id number] Right 1 Implanted   LINER ACET OD56MM ID36MM HIP ALTRX PINN - SNA  LINER ACET OD56MM ID36MM HIP ALTRX PINN NA [facility] [id number] Right 1 Implanted   SCREW BNE L50MM DIA6.5MM CANC HIP DOME PINN - SNA  SCREW BNE L50MM DIA6.5MM CANC HIP DOME PINN NA [facility] [id number] Right 1 Implanted   SCR ACET CANC PINN 6.5X15MM SS --  - SNA  SCR ACET CANC PINN 6.5X15MM SS --  NA [facility] [employer] [id number] Right 1 Implanted   STEM FEM SZ 8 L111MM 12/14 TAPR HI OFFSET HIP DUOFIX CLLRD - SNA  STEM FEM SZ 8 L111MM 12/14 TAPR HI OFFSET HIP DUOFIX CLLRD NA [facility] [id number] Right 1 Implanted   HEAD FEM [id number] +8MM OFFSET 12/14 TAPR HIP CERAMIC BIOLOX - SNA  HEAD FEM [id number] +8MM OFFSET 12/14 TAPR HIP CERAMIC BIOLOX NA [facility] [id number] Right 1 Implanted       INDICATIONS: 60 yrs male  with severe DJD of the right hip.   The patient's right hip has been progressive in terms of symptoms. The patient now has severe activity limitation. The patient has continued with conservative management without adequate relief or improvement of functional limitations. We discussed options and he wished to proceed with right total hip replacement. The patient has continued with conservative management without adequate relief or improvement of functional limitations. DESCRIPTION OF PROCEDURE: Anesthetic was initiated. Preoperative dose of IV Ancef was given. Mccann catheter was not placed. The right side was confirmed as the operative side, prepped and draped in the usual sterile fashion. Skin was covered with Ioban occlusive dressing. Direct anterior exposure was made to the patient's hip through the sartorius tensor interval. Anterior hip vasculature was cauterized. Retractors were taken out to observe for bleeding and there was none. The capsule was identified, opened and T'd distally. The femoral neck was osteotomized. Femoral head was removed from the acetabulum, which was exposed and soft tissues were removed. The acetabulum was progressively reamed to 55 and a 56 trial shell was impacted with good press-fit. This was removed and a 56 shell was impacted in the acetabulum in 40 degrees of abduction in an anatomic-type anteversion. Bone spurs were removed and 6.5 screws x2 were placed. The polyethylene liner was placed. Femur was positioned and elevated from the wound. The medullary canal was entered. Flexible reamers were not utilized as the patient did not have a narrowed femoral canal, broached to a size 8Hi. Calcar planed and then trialed. A 36 +8.5 hip ball was the most appropriate for leg length and tension with a standard offset stem. The hip was dislocated. The anterior greater trochanter was trimmed down to enhance flexion, rotation and stability. The trial was removed and the real stem was impacted. The real hip ball was placed.  The hip was reduced. After copious irrigation, the capsule was closed with #2 Vicryl sutures. I irrigated the skin, subcutaneous and deep wound. I closed the fascia of the tensor fascia farheen with #2 Vicryl sutures. Skin and subcutaneous were irrigated. Soft tissues were infiltrated with local anesthetic. Skin and subcutaneous were closed in a standard fashion. Sterile dressing was applied. There were no complications. No specimen was sent. The procedure was a RIGHT TOTAL HIP REPLACEMENT using a Depuy total hip construct. The patient was transferred to the recovery room in stable condition. Marina Sutherland PA-C was critical throughout the case to assist with positioning, retraction and closure. There were no other available residents or surgical assistants to assist during this procedure.     Emilie Gonzalez MD

## 2021-09-17 NOTE — ANESTHESIA PREPROCEDURE EVALUATION
Relevant Problems   No relevant active problems       Anesthetic History   No history of anesthetic complications            Review of Systems / Medical History  Patient summary reviewed, nursing notes reviewed and pertinent labs reviewed    Pulmonary  Within defined limits                 Neuro/Psych   Within defined limits           Cardiovascular              CAD    Exercise tolerance: >4 METS     GI/Hepatic/Renal  Within defined limits              Endo/Other        Arthritis     Other Findings              Physical Exam    Airway  Mallampati: II  TM Distance: 4 - 6 cm  Neck ROM: normal range of motion   Mouth opening: Normal     Cardiovascular    Rhythm: regular  Rate: normal         Dental  No notable dental hx       Pulmonary  Breath sounds clear to auscultation               Abdominal  Abdominal exam normal       Other Findings            Anesthetic Plan    ASA: 2  Anesthesia type: general          Induction: Intravenous  Anesthetic plan and risks discussed with: Patient

## 2021-09-17 NOTE — PROGRESS NOTES
Physical Therapy Screening:  Services are indicated and therapy order is required s/p surgery. An InBasket screening referral was triggered for physical therapy based on results obtained during the nursing admission assessment. The patients chart was reviewed and the patient is appropriate for a skilled therapy evaluation. Please order a consult for physical therapy if you are in agreement and would like an evaluation to be completed. Thank you.     Pallavi Chua, PT

## 2021-09-17 NOTE — H&P
Date of Surgery Update:  India Blancas was seen and examined. History and physical has been reviewed. The patient has been examined.  There have been no significant clinical changes since the completion of the originally dated History and Physical.    Signed By: Daljit Almazan MD     September 17, 2021 7:34 AM

## 2021-09-17 NOTE — PROGRESS NOTES
09/17/21 1230   Vital Signs   Pulse (Heart Rate) 66   Resp Rate 10   BP (!) 86/65   Oxygen Therapy   O2 Sat (%) 100 %   Pulse via Oximetry 66 beats per minute       Dr Jessica De La Cruz notified of SBP in 80's to 90's. Other VSS. Ephedrine and albumin ordered.  See MAR and flow sheet for follow up

## 2021-09-17 NOTE — DISCHARGE INSTRUCTIONS
Discharge Instructions Hip Replacement  Dr. Stacia Boykin      Patient Name  Azael Maxwell  Date of procedure  9/17/2021    Procedure  Procedure(s):  RIGHT TOTAL HIP ARTHROPLASTY ANTERIOR APPROACH  Surgeon  Surgeon(s) and Role:     * Alley Riley MD - Primary  Date of discharge: No discharge date for patient encounter. PCP: Bishnu Barber MD    Follow up care   Follow up visit with Dr. Stacia Boykin in 3-4 weeks. Call [phone]  to make an appointment    Activity at home   AVOID sudden and extreme movement of your hip (surgical leg)   Take a short walk every hour; except at night when sleeping   Do your Home Exercise Program 3 times every day    After exercising lie down and elevate your leg on pillows for 15-30 minutes to decrease swelling   Refer to your patient notebook for more information    Bathing and caring for your incision   You may take a shower with your waterproof dressing on your hip.  The waterproof dressing is to stay on your hip for 7 days.  On the 7th day have someone gently peel the dressing off by lifting the edge and stretching it to break the seal.   You may then leave your incision open to air unless you see drainage from your hip.  If you have staple closing your incision they will be removed by home health 14 days after your surgery. Preventing blood clots   Take Plavix 75 mg and aspirin 81 mg every day as prescribed as you normally do.  Call Dr. Stacia Boykin if you have side effects of blood thinning medication: bleeding, bruising, upset stomach, or diarrhea.  Call Dr. Stacia Boykin for signs of a blood clot in your leg: calf pain, tenderness, redness, swelling of lower leg    Preventing lung congestion   Use your incentive spirometer 4 times a day; do 10 repetitions each time   Remember to keep the small blue ball between the two arrows when taking a slow, deep breath           Pain Management   Get up and walk a short distance to relieve pain and stiffness.    Place ice wrap on your hip except when you are walking. The gel ice packs should be changed about every 4 hours   Elevate your leg on pillows for 15-30 minutes    Take Tylenol 650mg (take two 325mg tablets) every 6 hours for pain.  If needed, take a narcotic pain pill every 4-6 hours as prescribed.  Take all medications with a small amount of food.  As your pain decreases, take the narcotics less often or take ½ of a pill   Call Dr. Taina Ayala if you have side effects from your narcotic pain medication: itching, drowsiness, dizziness, upset stomach, dry mouth, constipation or if you medication is not relieving your pain. Diet after surgery   You may resume your normal diet. Include vegetables, fruit, whole grains, lean meats, and low-fat dairy products. Eat food high in fiber    Drink plenty of fluids, including 8 cups of water daily   Take Senokot-s twice a day to prevent constipation    Avoid after surgery   Do not take any over-the-counter medication for pain except Tylenol   Do not take more than 3000mg (3 grams) of Tylenol in 24 hours.  Do not drink alcoholic beverages   Do not smoke   Do not drive until seen for follow up appointment   Do not place frozen gel pack directly on your skin. It can cause frostbite.  Do not take a tub bath, swim or get in a hot tub for 6 weeks  Prevention of falls and safety at home   Set up an area where you can rest comfortably leaving space around furniture to allow you to walk with your walker   Keep stairs, hallways and bathrooms well lit; especially at night   Arrange for care for your pets   Keep your home free of clutter        Call Dr. Taina Ayala at [phone] for:   Pain that is not relieved by pain medication, ice and activity   Side effects of medications   Increased/spread of bruising   Warning signs of infection:  ? persistent fever greater than 100 degrees  ?  shaking or chills  ? increased redness, tenderness, swelling or drainage from incision  ? increased pain during activity or rest   Warning signs of a blood clot in your leg:  ? increased pain in your calf  ? tenderness or redness  ? increased swelling or knee, calf, ankle or foot    Call [phone] after 5pm or on a weekend.  The on call physician will return your phone call      Call your Primary Care Doctor for:    Concerns about your medical conditions such as diabetes, high blood pressure, asthma, congestive heart failure   Blood sugars greater than 180   Persistent headache or dizziness   Coughing or congestion   Constipation or diarrhea   Burning when you go to the bathroom   Abnormal heart rate (fast or slow)      Call 911 and go to the nearest hospital for:    Sudden increased shortness of breath   Sudden onset of chest pain   Difficulty breathing   Localized chest pain with coughing or taking a deep breath

## 2021-09-17 NOTE — PROGRESS NOTES
Bedside shift change report given to [facility] (oncoming nurse) by Miguel Titus (offgoing nurse). Report included the following information SBAR, Kardex, Intake/Output and MAR.

## 2021-09-17 NOTE — PROGRESS NOTES
October 8, 2020      Katherine Hutchinson MD  [address]  [facility] [address]           [facility]  [address]  [facility] [address]  Phone: [phone]  Fax: [phone]          Patient: Karli Hameed   MR Number: [id number]   YOB: 1930   Date of Visit: 10/8/2020       Dear Dr. Katherine Hutchinson:    Thank you for referring Karli Hameed to me for evaluation. Attached you will find relevant portions of my assessment and plan of care.    If you have questions, please do not hesitate to call me. I look forward to following Karli Hameed along with you.    Sincerely,    Chad Nash III, MD    Enclosure  CC:  No Recipients    If you would like to receive this communication electronically, please contact [email or web address] or [phone] to request more information on [employer] Link access.    For providers and/or their staff who would like to refer a patient to [facility], please contact us through our one-stop-shop provider referral line, [facility], at [phone].    If you feel you have received this communication in error or would no longer like to receive these types of communications, please e-mail [email or web address]          Physical Therapy 9/17/21    PT order received and acknowledged - pt arrived to 5S unit at 13:41 --- chart reviewed - attempted to see pt at 1515 - pt with c/o nausea, low Bp - pt provided with queaseEASE and nurse notified. Pt not appropriate for PT at this time - will see am of 9/18.   Kelsy Toure, PT

## 2021-09-17 NOTE — PERIOP NOTES
TRANSFER - OUT REPORT:    Verbal report given to Gita Sidhu on Tyrel Nunn  being transferred to room 570 for routine post - op       Report consisted of patients Situation, Background, Assessment and   Recommendations(SBAR). Time Pre op antibiotic given:  8821  Anesthesia Stop time:  6918  Mccann Present on Transfer to floor: NO  Order for Mccann on Chart: N/a    Information from the following report(s) SBAR and MAR was reviewed with the receiving nurse. Opportunity for questions and clarification was provided. Is the patient on 02? NO       L/Min r/a       Other     Is the patient on a monitor? NO    Is the nurse transporting with the patient? NO    Surgical Waiting Area notified of patient's transfer from PACU? YES    Lines:   Peripheral IV 09/17/21 Left; Anterior Wrist (Active)   Site Assessment Clean, dry, & intact 09/17/21 1200   Phlebitis Assessment 0 09/17/21 1230   Infiltration Assessment 0 09/17/21 1230   Dressing Status Clean, dry, & intact 09/17/21 1200   Dressing Type Transparent 09/17/21 1200   Hub Color/Line Status Infusing 09/17/21 1230        The following personal items collected during your admission accompanied patient upon transfer:   Dental Appliance: Dental Appliances: Uppers (In PACU)  Vision:    Hearing Aid:    Jewelry:    Clothing: Clothing:  (In PACU)  Other Valuables:    Valuables sent to safe:

## 2021-09-17 NOTE — ANESTHESIA POSTPROCEDURE EVALUATION
Post-Anesthesia Evaluation and Assessment    Patient: Rosales Clay MRN: [id number]  SSN: [id number]    YOB: 1955  Age: 60 y.o. Sex: male      I have evaluated the patient and they are stable and ready for discharge from the PACU. Cardiovascular Function/Vital Signs  Visit Vitals  BP 96/63 (BP 1 Location: Right upper arm)   Pulse 72   Temp 36.5 °C (97.7 °F)   Resp 16   Ht 6' 1\" (1.854 m)   Wt 106.6 kg (235 lb)   SpO2 96%   BMI 31.00 kg/m²       Patient is status post General anesthesia for Procedure(s):  RIGHT TOTAL HIP ARTHROPLASTY ANTERIOR APPROACH. Nausea/Vomiting: None    Postoperative hydration reviewed and adequate. Pain:  Pain Scale 1: Numeric (0 - 10) (09/17/21 1341)  Pain Intensity 1: 2 (09/17/21 1341)   Managed    Neurological Status:   Neuro (WDL): Within Defined Limits (no changes) (09/17/21 1245)  Neuro  Neurologic State: Alert;Eyes open spontaneously; Restless (09/17/21 1152)  LUE Motor Response: Purposeful (09/17/21 1152)  LLE Motor Response: Purposeful (09/17/21 1152)  RUE Motor Response: Purposeful (09/17/21 1152)  RLE Motor Response: Purposeful (09/17/21 1152)   At baseline    Mental Status, Level of Consciousness: Alert and  oriented to person, place, and time    Pulmonary Status:   O2 Device: None (Room air) (09/17/21 1341)   Adequate oxygenation and airway patent    Complications related to anesthesia: None    Post-anesthesia assessment completed.  No concerns    Signed By: Errol Pereira MD     September 17, 2021

## 2021-09-18 LAB
ANION GAP SERPL CALC-SCNC: 4 MMOL/L (ref 5–15)
BUN SERPL-MCNC: 11 MG/DL (ref 6–20)
BUN/CREAT SERPL: 10 (ref 12–20)
CALCIUM SERPL-MCNC: 9.7 MG/DL (ref 8.5–10.1)
CHLORIDE SERPL-SCNC: 102 MMOL/L (ref 97–108)
CO2 SERPL-SCNC: 29 MMOL/L (ref 21–32)
CREAT SERPL-MCNC: 1.05 MG/DL (ref 0.7–1.3)
GLUCOSE SERPL-MCNC: 107 MG/DL (ref 65–100)
HGB BLD-MCNC: 9.9 G/DL (ref 12.1–17)
POTASSIUM SERPL-SCNC: 3.7 MMOL/L (ref 3.5–5.1)
SODIUM SERPL-SCNC: 135 MMOL/L (ref 136–145)

## 2021-09-18 PROCEDURE — 51798 US URINE CAPACITY MEASURE: CPT

## 2021-09-18 PROCEDURE — 74011250637 HC RX REV CODE- 250/637: Performed by: ORTHOPAEDIC SURGERY

## 2021-09-18 PROCEDURE — 97535 SELF CARE MNGMENT TRAINING: CPT

## 2021-09-18 PROCEDURE — 74011000250 HC RX REV CODE- 250: Performed by: PHYSICIAN ASSISTANT

## 2021-09-18 PROCEDURE — 97161 PT EVAL LOW COMPLEX 20 MIN: CPT

## 2021-09-18 PROCEDURE — 80048 BASIC METABOLIC PNL TOTAL CA: CPT

## 2021-09-18 PROCEDURE — 74011250636 HC RX REV CODE- 250/636: Performed by: PHYSICIAN ASSISTANT

## 2021-09-18 PROCEDURE — 97530 THERAPEUTIC ACTIVITIES: CPT

## 2021-09-18 PROCEDURE — 74011250637 HC RX REV CODE- 250/637: Performed by: STUDENT IN AN ORGANIZED HEALTH CARE EDUCATION/TRAINING PROGRAM

## 2021-09-18 PROCEDURE — 97110 THERAPEUTIC EXERCISES: CPT

## 2021-09-18 PROCEDURE — 74011250637 HC RX REV CODE- 250/637: Performed by: PHYSICIAN ASSISTANT

## 2021-09-18 PROCEDURE — 97116 GAIT TRAINING THERAPY: CPT

## 2021-09-18 PROCEDURE — 36415 COLL VENOUS BLD VENIPUNCTURE: CPT

## 2021-09-18 PROCEDURE — 97165 OT EVAL LOW COMPLEX 30 MIN: CPT

## 2021-09-18 PROCEDURE — 85018 HEMOGLOBIN: CPT

## 2021-09-18 RX ORDER — MAG HYDROX/ALUMINUM HYD/SIMETH 200-200-20
30 SUSPENSION, ORAL (FINAL DOSE FORM) ORAL
Status: DISCONTINUED | OUTPATIENT
Start: 2021-09-18 | End: 2021-09-19 | Stop reason: HOSPADM

## 2021-09-18 RX ORDER — METOPROLOL SUCCINATE 50 MG/1
50 TABLET, EXTENDED RELEASE ORAL DAILY
Status: DISCONTINUED | OUTPATIENT
Start: 2021-09-19 | End: 2021-09-19 | Stop reason: HOSPADM

## 2021-09-18 RX ADMIN — ACETAMINOPHEN 1000 MG: 500 TABLET ORAL at 09:32

## 2021-09-18 RX ADMIN — CALCIUM CARBONATE (ANTACID) CHEW TAB 500 MG 200 MG: 500 CHEW TAB at 00:25

## 2021-09-18 RX ADMIN — ALUMINUM HYDROXIDE, MAGNESIUM HYDROXIDE, AND SIMETHICONE 30 ML: 200; 200; 20 SUSPENSION ORAL at 00:44

## 2021-09-18 RX ADMIN — POLYETHYLENE GLYCOL 3350 17 G: 17 POWDER, FOR SOLUTION ORAL at 09:31

## 2021-09-18 RX ADMIN — ACETAMINOPHEN 1000 MG: 500 TABLET ORAL at 22:16

## 2021-09-18 RX ADMIN — Medication 10 ML: at 22:00

## 2021-09-18 RX ADMIN — ALUMINUM HYDROXIDE, MAGNESIUM HYDROXIDE, AND SIMETHICONE 30 ML: 200; 200; 20 SUSPENSION ORAL at 05:00

## 2021-09-18 RX ADMIN — SENNOSIDES AND DOCUSATE SODIUM 1 TABLET: 50; 8.6 TABLET ORAL at 09:32

## 2021-09-18 RX ADMIN — ASPIRIN 81 MG: 81 TABLET, COATED ORAL at 09:32

## 2021-09-18 RX ADMIN — ACETAMINOPHEN 1000 MG: 500 TABLET ORAL at 17:00

## 2021-09-18 RX ADMIN — LORAZEPAM 0.5 MG: 0.5 TABLET ORAL at 22:16

## 2021-09-18 RX ADMIN — ACETAMINOPHEN 1000 MG: 500 TABLET ORAL at 01:29

## 2021-09-18 RX ADMIN — ROSUVASTATIN 40 MG: 40 TABLET, FILM COATED ORAL at 22:16

## 2021-09-18 RX ADMIN — CALCIUM CARBONATE (ANTACID) CHEW TAB 500 MG 200 MG: 500 CHEW TAB at 03:43

## 2021-09-18 RX ADMIN — Medication 10 ML: at 17:01

## 2021-09-18 RX ADMIN — CLOPIDOGREL BISULFATE 75 MG: 75 TABLET ORAL at 09:33

## 2021-09-18 RX ADMIN — CEFAZOLIN SODIUM 2 G: 1 INJECTION, POWDER, FOR SOLUTION INTRAMUSCULAR; INTRAVENOUS at 00:36

## 2021-09-18 RX ADMIN — LANSOPRAZOLE 30 MG: KIT at 13:20

## 2021-09-18 RX ADMIN — Medication 10 ML: at 08:38

## 2021-09-18 RX ADMIN — FAMOTIDINE 10 MG: 20 TABLET, FILM COATED ORAL at 17:07

## 2021-09-18 RX ADMIN — OXYCODONE 10 MG: 5 TABLET ORAL at 17:07

## 2021-09-18 RX ADMIN — SODIUM CHLORIDE 125 ML/HR: 900 INJECTION, SOLUTION INTRAVENOUS at 05:27

## 2021-09-18 RX ADMIN — FAMOTIDINE 10 MG: 20 TABLET, FILM COATED ORAL at 09:33

## 2021-09-18 RX ADMIN — CALCIUM CARBONATE (ANTACID) CHEW TAB 500 MG 200 MG: 500 CHEW TAB at 01:30

## 2021-09-18 RX ADMIN — ALUMINUM HYDROXIDE, MAGNESIUM HYDROXIDE, AND SIMETHICONE 30 ML: 200; 200; 20 SUSPENSION ORAL at 08:37

## 2021-09-18 RX ADMIN — METOPROLOL SUCCINATE 50 MG: 50 TABLET, EXTENDED RELEASE ORAL at 09:33

## 2021-09-18 NOTE — PROGRESS NOTES
Problem: Mobility Impaired (Adult and Pediatric)  Goal: *Acute Goals and Plan of Care (Insert Text)  Description: FUNCTIONAL STATUS PRIOR TO ADMISSION: Patient was modified independent using a SB quad cane for functional mobility. HOME SUPPORT PRIOR TO ADMISSION: The patient lived with wife but did not require assist.    Physical Therapy Goals  Initiated 9/18/2021    1. Patient will move from supine to sit and sit to supine  and scoot up and down in bed with modified independence within 4 days. 2. Patient will perform sit to stand with modified independence within 4 days. 3. Patient will ambulate with modified independence for > 150 feet with the least restrictive device within 4 days. 4. Patient will ascend/descend 4 stairs with one handrail(s) with modified independence within 4 days. 5. Patient will perform THR home exercise program per protocol with supervision/set-up within 4 days. 9/18/2021 0941 by Hakan Womack, PT  Outcome: Progressing Towards Goal   PHYSICAL THERAPY TREATMENT  Patient: Cruzito Watkins (44 y.o. male)  Date: 9/18/2021  Diagnosis: Primary localized osteoarthritis of right hip [M16.11] <principal problem not specified>  Procedure(s) (LRB):  RIGHT TOTAL HIP ARTHROPLASTY ANTERIOR APPROACH (Right) 1 Day Post-Op  Precautions: WBAT  Chart, physical therapy assessment, plan of care and goals were reviewed. ASSESSMENT  Patient continues with skilled PT services and is progressing towards goals. Pt required increased assistance in/out of bed today - attempted use of gait belt to lift leg - however - required max assist in/out of bed. Pt reporting increased pain and stiffness. Amb distance increasing and independent recall/demo of supine exercise. Will need to clear on stairs prior to discharge and improve in/out of bed.      Current Level of Function Impacting Discharge (mobility/balance): Supine/sit - max assist; CGA for amb with RW    Other factors to consider for discharge: pt lives 2 hrs away - recommend pt to stay once hr to stand/walk to decrease discomfort         PLAN :  Patient continues to benefit from skilled intervention to address the above impairments. Continue treatment per established plan of care. to address goals. Recommendation for discharge: (in order for the patient to meet his/her long term goals)  Physical therapy at least 2 days/week in the home     This discharge recommendation:  Has been made in collaboration with the attending provider and/or case management    IF patient discharges home will need the following DME: patient owns DME required for discharge       SUBJECTIVE:   Patient stated it's just so stiff.     OBJECTIVE DATA SUMMARY:   Critical Behavior:  Neurologic State: Alert  Orientation Level: Oriented X4  Cognition: Appropriate for age attention/concentration  Safety/Judgement: Insight into deficits  Functional Mobility Training:  Bed Mobility:     Supine to Sit: Maximum assistance;Assist x1  Sit to Supine: Maximum assistance;Assist x1  Scooting: Stand-by assistance        Transfers:  Sit to Stand: Contact guard assistance  Stand to Sit: Contact guard assistance                             Balance:  Sitting: Intact; Without support  Standing: Impaired; With support  Standing - Static: Good;Constant support  Standing - Dynamic : Good;Constant support  Ambulation/Gait Training:  Distance (ft): 125 Feet (ft)  Assistive Device: Walker, rolling;Gait belt  Ambulation - Level of Assistance: Contact guard assistance        Gait Abnormalities: Antalgic;Decreased step clearance  Right Side Weight Bearing: As tolerated  Left Side Weight Bearing: Full  Base of Support: Center of gravity altered; Widened;Shift to left  Stance: Right decreased  Speed/Nikki: Slow  Step Length: Right shortened;Left shortened  Swing Pattern: Right asymmetrical           Therapeutic Exercises:   SUPINE  EXERCISES   Sets   Reps   Active Active Assist   Passive Self ROM   Comments   Ankle Pumps 5 [x]                                        []                                        []                                        []                                           Quad Sets  5 [x]                                        []                                        []                                        []                                           Heel Slides  5 [x]                                        []                                        []                                        []                                           Hip Abduction   []                                        []                                        []                                        []                                           Glut Sets  5 [x]                                        []                                        []                                        []                                              []                                        []                                        []                                        []                                              []                                        []                                        []                                        []                                               Pain Rating:  Right hip at rest 2/10; with movement or weight bearing 6/10    Activity Tolerance:   Fair - limited by pain - up in chair x 1 today - 2 hrs      After treatment patient left in no apparent distress:   Supine in bed, Call bell within reach, and ice applied right hip    COMMUNICATION/COLLABORATION:   The patients plan of care was discussed with: Registered nurse.      Clarisse Haynes, PT   Time Calculation: 25 mins

## 2021-09-18 NOTE — PROGRESS NOTES
Nurse Ranulfo Pearce gave bedside report to  Oncoming nurse Eric Valdovinos RN by BETO and Christian

## 2021-09-18 NOTE — PROGRESS NOTES
Problem: Mobility Impaired (Adult and Pediatric)  Goal: *Acute Goals and Plan of Care (Insert Text)  Description: FUNCTIONAL STATUS PRIOR TO ADMISSION: Patient was modified independent using a SB quad cane for functional mobility. HOME SUPPORT PRIOR TO ADMISSION: The patient lived with wife but did not require assist.    Physical Therapy Goals  Initiated 9/18/2021    1. Patient will move from supine to sit and sit to supine  and scoot up and down in bed with modified independence within 4 days. 2. Patient will perform sit to stand with modified independence within 4 days. 3. Patient will ambulate with modified independence for > 150 feet with the least restrictive device within 4 days. 4. Patient will ascend/descend 4 stairs with one handrail(s) with modified independence within 4 days. 5. Patient will perform THR home exercise program per protocol with supervision/set-up within 4 days. PHYSICAL THERAPY EVALUATION  Patient: Prem Luevano (39 y.o. male)  Date: 9/18/2021  Primary Diagnosis: Primary localized osteoarthritis of right hip [M16.11]  Procedure(s) (LRB):  RIGHT TOTAL HIP ARTHROPLASTY ANTERIOR APPROACH (Right) 1 Day Post-Op   Precautions:  WBAT    ASSESSMENT  Based on the objective data described below, the patient presents POD# 1 s/p Right JAMES with pain right hip, decreased AROM/strength and function right leg, decline in functional mobility, decreased activity tolerance and impaired standing balance/gait with RW. Pt also with recent heart attack. Patient with low Bp post op. Today - Bp initially improved and pt tolerated amb to doorway and to bathroom. After pt returning to [facility] pt c/o dizziness which improved with legs elevated in recliner. Will see again this pm and anticipate pt will require 1 - 2 more visits to clear for discharge from Physical Therapy.     Vitals:    09/18/21 0856 09/18/21 0916 09/18/21 0918 09/18/21 0929   BP: 115/76 (!) 97/58 131/89 109/64   BP 1 Location: Right upper arm Right upper arm Right upper arm Left upper arm   BP Patient Position: Standing Sitting  Comment: after walking from bathroom Sitting; At rest At rest;Sitting   Pulse: 86 94 80 78   Temp:    97.4 °F (36.3 °C)   Resp:    14   Height:       Weight:       SpO2:    98%        Current Level of Function Impacting Discharge (mobility/balance): CGA for functional mobility, difficulty standing from from low surfaces, hypotension post op    Functional Outcome Measure: The patient scored 55/100 on the Barthel Index outcome measure . Other factors to consider for discharge:      Patient will benefit from skilled therapy intervention to address the above noted impairments. PLAN :  Recommendations and Planned Interventions: bed mobility training, transfer training, gait training, therapeutic exercises, patient and family training/education and therapeutic activities      Frequency/Duration: Patient will be followed by physical therapy:  twice daily to address goals. Recommendation for discharge: (in order for the patient to meet his/her long term goals)  Physical therapy at least 2 days/week in the home     This discharge recommendation:  Has not yet been discussed the attending provider and/or case management    IF patient discharges home will need the following DME: elevated toilet seat with handrails         SUBJECTIVE:   Patient stated I have a tendency to hold my breath.     OBJECTIVE DATA SUMMARY:   HISTORY:    Past Medical History:   Diagnosis Date    Adverse effect of anesthesia     DJD    CAD (coronary artery disease) 2000 and 2020    Pt reports  2 HA     Past Surgical History:   Procedure Laterality Date    MT CARDIAC SURG PROCEDURE UNLIST      2       Personal factors and/or comorbidities impacting plan of care: as above    Home Situation  Home Environment: Private residence  # Steps to Enter: 4  Rails to Enter: Yes  Hand Rails : Bilateral  One/Two Story Residence: One story  Support Systems: Spouse/Significant Other  Patient Expects to be Discharged to[de-identified] House  Current DME Used/Available at Home: Cane, quad  Tub or Shower Type: Tub/Shower combination    EXAMINATION/PRESENTATION/DECISION MAKING:   Critical Behavior:  Neurologic State: Alert  Orientation Level: Oriented X4  Cognition: Appropriate for age attention/concentration  Safety/Judgement: Insight into deficits  Hearing: Auditory  Auditory Impairment: None  Range Of Motion:  AROM: Within functional limits           PROM: Within functional limits           Strength:    Strength: Within functional limits                    Tone & Sensation:   Tone: Normal              Sensation: Impaired (reports numbness in face)               Coordination:  Coordination: Grossly decreased, non-functional  Vision:   Corrective Lenses: Reading glasses  Functional Mobility:  Bed Mobility:     Supine to Sit: Contact guard assistance     Scooting: Stand-by assistance  Transfers:  Sit to Stand: Contact guard assistance  Stand to Sit: Contact guard assistance                       Balance:   Sitting: Intact; Without support  Standing: Impaired; With support  Standing - Static: Good;Constant support  Standing - Dynamic : Good;Constant support  Ambulation/Gait Training:  Distance (ft): 30 Feet (ft) (x 2)  Assistive Device: Walker, rolling;Gait belt  Ambulation - Level of Assistance: Contact guard assistance        Gait Abnormalities: Antalgic;Decreased step clearance  Right Side Weight Bearing: As tolerated  Left Side Weight Bearing: Full  Base of Support: Center of gravity altered; Widened;Shift to left  Stance: Right decreased  Speed/Nikki: Slow  Step Length: Right shortened;Left shortened  Swing Pattern: Right asymmetrical              Therapeutic Exercises:   Pt instructed and demonstrated ankle pumps, quad sets, gluteal sets and heel slides - to be performed 3 x day except for ankle pumps x 10 once hr when awake.     Functional Measure:  Barthel Index:    Bathing: 0  Bladder: 10  Bowels: 10  Groomin  Dressin  Feeding: 10  Mobility: 0  Stairs: 0  Toilet Use: 5  Transfer (Bed to Chair and Back): 10  Total: 55/100       The Barthel ADL Index: Guidelines  1. The index should be used as a record of what a patient does, not as a record of what a patient could do. 2. The main aim is to establish degree of independence from any help, physical or verbal, however minor and for whatever reason. 3. The need for supervision renders the patient not independent. 4. A patient's performance should be established using the best available evidence. Asking the patient, friends/relatives and nurses are the usual sources, but direct observation and common sense are also important. However direct testing is not needed. 5. Usually the patient's performance over the preceding 24-48 hours is important, but occasionally longer periods will be relevant. 6. Middle categories imply that the patient supplies over 50 per cent of the effort. 7. Use of aids to be independent is allowed. Damaso Main, Barthel, D.W. (7228). Functional evaluation: the Barthel Index. [address] [facility] (14)2. Yu Grimes tylor SHIREEN CaballeroF, Maeve Shaver., [employer] [place], [address] (). Measuring the change indisability after inpatient rehabilitation; comparison of the responsiveness of the Barthel Index and Functional Jefferson Measure. Journal of Neurology, Neurosurgery, and Psychiatry, 66(4), 020-873. RACHAEL Morrison.[email or web address].A, PRIYA Donald.ALEXIS, & Jeannine Aviles MKOBY. (2004.) Assessment of post-stroke quality of life in cost-effectiveness studies: The usefulness of the Barthel Index and the EuroQoL-5D.  Quality of Life Research, 15, 883-52           Physical Therapy Evaluation Charge Determination   History Examination Presentation Decision-Making   LOW Complexity : Zero comorbidities / personal factors that will impact the outcome / POC LOW Complexity : 1-2 Standardized tests and measures addressing body structure, function, activity limitation and / or participation in recreation  LOW Complexity : Stable, uncomplicated  LOW Complexity : FOTO score of       Based on the above components, the patient evaluation is determined to be of the following complexity level: LOW     Pain Rating:  Right hip 6/10    Activity Tolerance:   Fair    After treatment patient left in no apparent distress:   Sitting in chair, Call bell within reach, and legs elevated    COMMUNICATION/EDUCATION:   The patients plan of care was discussed with: Occupational therapist and Registered nurse. Fall prevention education was provided and the patient/caregiver indicated understanding., Patient/family have participated as able in goal setting and plan of care. , and Patient/family agree to work toward stated goals and plan of care.     Thank you for this referral.  Reggie Service, PT   Time Calculation: 58 mins

## 2021-09-18 NOTE — PROGRESS NOTES
Orthopedic Progress Note    Subjective:    Patient seen and examined. NAEO. Having GERD and hypoTN yesterday. Did not get OOB  maylin diet now  -FC,NV,CP,SOB    Objective    Vitals:    09/17/21 1553 09/17/21 1701 09/17/21 2134 09/18/21 0125   BP: 105/71 98/61 108/64 129/79   Pulse: 64 63 81 83   Resp: 17 16 16 16   Temp: 97.4 °F (36.3 °C) 97.5 °F (36.4 °C) 97.8 °F (36.6 °C) 98.3 °F (36.8 °C)   SpO2: 94% 98% 95% 95%   Weight:       Height:         Recent Results (from the past 24 hour(s))   TYPE & SCREEN    Collection Time: 09/17/21  8:21 AM   Result Value Ref Range    Crossmatch Expiration 09/20/2021,2359     ABO/Rh(D) A POSITIVE     Antibody screen NEG    GLUCOSE, POC    Collection Time: 09/17/21  5:13 PM   Result Value Ref Range    Glucose (POC) 137 (H) 65 - 117 mg/dL    Performed by Eduardo 14, POC    Collection Time: 09/17/21  9:32 PM   Result Value Ref Range    Glucose (POC) 128 (H) 65 - 117 mg/dL    Performed by Cristal [facility]    METABOLIC PANEL, BASIC    Collection Time: 09/18/21  1:35 AM   Result Value Ref Range    Sodium 135 (L) 136 - 145 mmol/L    Potassium 3.7 3.5 - 5.1 mmol/L    Chloride 102 97 - 108 mmol/L    CO2 29 21 - 32 mmol/L    Anion gap 4 (L) 5 - 15 mmol/L    Glucose 107 (H) 65 - 100 mg/dL    BUN 11 6 - 20 MG/DL    Creatinine 1.05 0.70 - 1.30 MG/DL    BUN/Creatinine ratio 10 (L) 12 - 20      GFR est AA >60 >60 ml/min/1.73m2    GFR est non-AA >60 >60 ml/min/1.73m2    Calcium 9.7 8.5 - 10.1 MG/DL   HEMOGLOBIN    Collection Time: 09/18/21  1:35 AM   Result Value Ref Range    HGB 9.9 (L) 12.1 - 17.0 g/dL          PT: consulted  Drain: none    Gen: Cooper Weller is a 60 y.o. male who is  A&O x 3.      Musculoskeletal:       RLE     Dressing c/d/i   Sensation intact to light touch L3-S1 nerve root dermatomes   Active dorsiflexion and plantarflexion intact   Toes up and down going    Brisk capillary refill in all toes   Compartments soft and compressible   Palpable DP/PT    Assessment:      Right JAMES 1 Day Post-Op    Plan:    Ortho admit  Post op abx  WBAT RLE  Ice  Pain control  Monitor labs  PT  Dispo: possibly dc today if cleared by PT        Randall Hays DO  Orthopedic Surgery  8:14 AM9/18/2021

## 2021-09-18 NOTE — PROGRESS NOTES
Problem: Self Care Deficits Care Plan (Adult)  Goal: *Acute Goals and Plan of Care (Insert Text)  Description: FUNCTIONAL STATUS PRIOR TO ADMISSION: Patient was modified independent using a SB quad cane for functional mobility. HOME SUPPORT PRIOR TO ADMISSION: The patient lived with wife but did not require assist.    Occupational Therapy Goals  Initiated 9/18/2021  1. Patient will perform lower body dressing with supervision/set-up using AE PRN within 7 day(s). 2.  Patient will perform upper body dressing with modified independence within 7 day(s). 3.  Patient will perform grooming with modified independence within 7 day(s). 4.  Patient will perform toilet transfers with supervision/set-up within 7 day(s). 5.  Patient will perform all aspects of toileting with supervision/set-up within 7 day(s). 6.  Patient will participate in upper extremity therapeutic exercise/activities with supervision/set-up for 5 minutes within 7 day(s). 7.  Patient will utilize energy conservation techniques during functional activities with verbal cues within 7 day(s). Outcome: Progressing Towards Goal   OCCUPATIONAL THERAPY EVALUATION  Patient: Olinda Alas (19 y.o. male)  Date: 9/18/2021  Primary Diagnosis: Primary localized osteoarthritis of right hip [M16.11]  Procedure(s) (LRB):  RIGHT TOTAL HIP ARTHROPLASTY ANTERIOR APPROACH (Right) 1 Day Post-Op   Precautions: fall, WBAT    ASSESSMENT  Based on the objective data described below, the patient presents with generalized weakness, decreased endurance, impaired balance, and decreased activity tolerance s/p R JAMES anterior approach POD 1(WBAT). PTA, patient was MOD I using a quad cane and living with his wife. Patient largely CGA for functional mobility using walker at this time however with difficulty standing from low surfaces. Recommend HHOT and a raised toilet seat for discharge.      Current Level of Function Impacting Discharge (ADLs/self-care): up to MOD A LB ADLs (uses AE at baseline)    Functional Outcome Measure: The patient scored 55/100 on the Barthel Index outcome measure which is indicative of 45% ADL impairment. Other factors to consider for discharge: support from spouse     Patient will benefit from skilled therapy intervention to address the above noted impairments. PLAN :  Recommendations and Planned Interventions: self care training, functional mobility training, therapeutic exercise, balance training, therapeutic activities, endurance activities, patient education, home safety training, and family training/education    Frequency/Duration: Patient will be followed by occupational therapy 5 times a week to address goals. Recommendation for discharge: (in order for the patient to meet his/her long term goals)  Occupational therapy at least 2 days/week in the home     This discharge recommendation:  Has not yet been discussed the attending provider and/or case management    IF patient discharges home will need the following DME: toilet safety frame       SUBJECTIVE:   Patient stated I usually push from the middle of the toilet at home to stand.     OBJECTIVE DATA SUMMARY:   HISTORY:   Past Medical History:   Diagnosis Date    Adverse effect of anesthesia     DJD    CAD (coronary artery disease) 2000 and 2020    Pt reports  2 HA     Past Surgical History:   Procedure Laterality Date    MD CARDIAC SURG PROCEDURE UNLIST      2       Expanded or extensive additional review of patient history:     Home Situation  Home Environment: Private residence  # Steps to Enter: 4  Rails to Enter: Yes  Hand Rails : Bilateral  One/Two Story Residence: One story  Support Systems: Spouse/Significant Other  Patient Expects to be Discharged to[de-identified] [employer]  Current DME Used/Available at Home: Cane, quad  Tub or Shower Type: Tub/Shower combination    Hand dominance: Right    EXAMINATION OF PERFORMANCE DEFICITS:  Cognitive/Behavioral Status:  Neurologic State: Alert  Orientation Level: Oriented X4  Cognition: Appropriate for age attention/concentration  Perception: Appears intact  Perseveration: No perseveration noted  Safety/Judgement: Insight into deficits    Skin: exposed areas grossly intact    Edema: RLE    Hearing: Auditory  Auditory Impairment: None    Vision/Perceptual:                                Corrective Lenses: Reading glasses    Range of Motion:  BUE  AROM: Within functional limits  PROM: Within functional limits                      Strength:  BUE  Strength: Within functional limits                Coordination:  Coordination: Grossly decreased, non-functional  Fine Motor Skills-Upper: Left Intact; Right Intact    Gross Motor Skills-Upper: Left Intact; Right Intact    Tone & Sensation:  Tone: Normal  Sensation: Impaired (reports numbness in face)                      Balance:  Sitting: Intact; Without support  Standing: Impaired; With support  Standing - Static: Good;Constant support  Standing - Dynamic : Good;Constant support    Functional Mobility and Transfers for ADLs:  Bed Mobility:  Supine to Sit: Contact guard assistance  Scooting: Stand-by assistance    Transfers:  Sit to Stand: Contact guard assistance  Stand to Sit: Contact guard assistance  Toilet Transfer : Contact guard assistance    ADL Assessment:  Patient recalled and demonstrated avoiding extreme planes of movement with Right LE during ADLs and functional mobility with verbal cues. Feeding: Setup    Oral Facial Hygiene/Grooming: Setup;Supervision    Bathing: Moderate assistance (infer A for BLE)    Upper Body Dressing: Setup;Supervision (infer 2* BUE ROM)    Lower Body Dressing:  Moderate assistance (uses AE at baseline for socks)    Toileting: Contact guard assistance                ADL Intervention and task modifications:         Grooming  Position Performed: Standing  Washing Hands: Contact guard assistance                   Lower Body Dressing Assistance  Underpants: Moderate assistance (simulated)  Socks: Total assistance (dependent)    Toileting  Toileting Assistance: Contact guard assistance  Bladder Hygiene: Contact guard assistance  Bowel Hygiene: Contact guard assistance    Cognitive Retraining  Safety/Judgement: Insight into deficits    Functional Measure:  Barthel Index:    Bathin  Bladder: 10  Bowels: 10  Groomin  Dressin  Feeding: 10  Mobility: 0  Stairs: 0  Toilet Use: 5  Transfer (Bed to Chair and Back): 10  Total: 55/100        The Barthel ADL Index: Guidelines  1. The index should be used as a record of what a patient does, not as a record of what a patient could do. 2. The main aim is to establish degree of independence from any help, physical or verbal, however minor and for whatever reason. 3. The need for supervision renders the patient not independent. 4. A patient's performance should be established using the best available evidence. Asking the patient, friends/relatives and nurses are the usual sources, but direct observation and common sense are also important. However direct testing is not needed. 5. Usually the patient's performance over the preceding 24-48 hours is important, but occasionally longer periods will be relevant. 6. Middle categories imply that the patient supplies over 50 per cent of the effort. 7. Use of aids to be independent is allowed. Taryn Chase., Barthel, D.W. ([id number]). Functional evaluation: the Barthel Index. [address] [facility] (14)2. KAREN Dash, Brittany Terry., [facility], [address] [place] [address] (). Measuring the change indisability after inpatient rehabilitation; comparison of the responsiveness of the Barthel Index and Functional [place] Measure. Journal of Neurology, Neurosurgery, and Psychiatry, 66(4), 303-173. Ace Otoole, N.J.A, PRIYA Donald.ALEXIS, & Ricky Fletcher, M.A. (2004.) Assessment of post-stroke quality of life in cost-effectiveness studies: The usefulness of the Barthel Index and the EuroQoL-5D. Quality of Life Research, 15, 370-93        Occupational Therapy Evaluation Charge Determination   History Examination Decision-Making   LOW Complexity : Brief history review  MEDIUM Complexity : 3-5 performance deficits relating to physical, cognitive , or psychosocial skils that result in activity limitations and / or participation restrictions LOW Complexity : No comorbidities that affect functional and no verbal or physical assistance needed to complete eval tasks       Based on the above components, the patient evaluation is determined to be of the following complexity level: LOW   Pain Ratin/10 RLE    Activity Tolerance:   Fair, requires rest breaks, and signs and symptoms of orthostatic hypotension    After treatment patient left in no apparent distress:    Supine in bed, Call bell within reach, and Side rails x 3    COMMUNICATION/EDUCATION:   The patients plan of care was discussed with: Physical therapist and Registered nurse. Patient/family agree to work toward stated goals and plan of care. This patients plan of care is appropriate for delegation to [facility].     Thank you for this referral.  Toy Deer  Time Calculation: 41 mins

## 2021-09-19 VITALS
RESPIRATION RATE: 17 BRPM | DIASTOLIC BLOOD PRESSURE: 56 MMHG | SYSTOLIC BLOOD PRESSURE: 118 MMHG | BODY MASS INDEX: 31.14 KG/M2 | WEIGHT: 235 LBS | OXYGEN SATURATION: 97 % | HEART RATE: 75 BPM | TEMPERATURE: 98.2 F | HEIGHT: 73 IN

## 2021-09-19 PROCEDURE — 74011250637 HC RX REV CODE- 250/637: Performed by: ORTHOPAEDIC SURGERY

## 2021-09-19 PROCEDURE — 97116 GAIT TRAINING THERAPY: CPT

## 2021-09-19 PROCEDURE — 74011250637 HC RX REV CODE- 250/637: Performed by: PHYSICIAN ASSISTANT

## 2021-09-19 RX ADMIN — ACETAMINOPHEN 1000 MG: 500 TABLET ORAL at 09:35

## 2021-09-19 RX ADMIN — ASPIRIN 81 MG: 81 TABLET, COATED ORAL at 09:35

## 2021-09-19 RX ADMIN — CLOPIDOGREL BISULFATE 75 MG: 75 TABLET ORAL at 09:35

## 2021-09-19 RX ADMIN — LANSOPRAZOLE 30 MG: KIT at 07:13

## 2021-09-19 RX ADMIN — Medication 10 ML: at 06:00

## 2021-09-19 RX ADMIN — OXYCODONE 10 MG: 5 TABLET ORAL at 09:35

## 2021-09-19 RX ADMIN — METOPROLOL SUCCINATE 50 MG: 50 TABLET, EXTENDED RELEASE ORAL at 09:35

## 2021-09-19 RX ADMIN — FAMOTIDINE 10 MG: 20 TABLET, FILM COATED ORAL at 09:35

## 2021-09-19 RX ADMIN — ACETAMINOPHEN 1000 MG: 500 TABLET ORAL at 02:35

## 2021-09-19 NOTE — PROGRESS NOTES
Problem: Mobility Impaired (Adult and Pediatric)  Goal: *Acute Goals and Plan of Care (Insert Text)  Description: FUNCTIONAL STATUS PRIOR TO ADMISSION: Patient was modified independent using a SB quad cane for functional mobility. HOME SUPPORT PRIOR TO ADMISSION: The patient lived with wife but did not require assist.    Physical Therapy Goals  Initiated 9/18/2021    1. Patient will move from supine to sit and sit to supine  and scoot up and down in bed with modified independence within 4 days. 2. Patient will perform sit to stand with modified independence within 4 days. 3. Patient will ambulate with modified independence for > 150 feet with the least restrictive device within 4 days. 4. Patient will ascend/descend 4 stairs with one handrail(s) with modified independence within 4 days. 5. Patient will perform THR home exercise program per protocol with supervision/set-up within 4 days. Outcome: Progressing Towards Goal    PHYSICAL THERAPY TREATMENT  Patient: Aspen Moe (21 y.o. male)  Date: 9/19/2021  Diagnosis: Primary localized osteoarthritis of right hip [M16.11] <principal problem not specified>  Procedure(s) (LRB):  RIGHT TOTAL HIP ARTHROPLASTY ANTERIOR APPROACH (Right) 2 Days Post-Op  Precautions: WBAT  Chart, physical therapy assessment, plan of care and goals were reviewed. ASSESSMENT  Patient continues with skilled PT services and is progressing towards goals. Pt was able to decrease assist level with mobility. Pt completed steps for home entry and access. Pt had no LOB with gait. Pt will have wife to assist at discharge. Pt is cleared for discharge  from PT standpoint. Pt needs rolling walker order placed with CM. Current Level of Function Impacting Discharge (mobility/balance): CGA    Other factors to consider for discharge: right THR         PLAN :  Patient continues to benefit from skilled intervention to address the above impairments.   Continue treatment per established plan of care.  to address goals. Recommendation for discharge: (in order for the patient to meet his/her long term goals)  Physical therapy at least 2 days/week in the home AND ensure assist and/or supervision for safety with mobility as needed    This discharge recommendation:  Has not yet been discussed the attending provider and/or case management    IF patient discharges home will need the following DME: rolling walker-order placed  Owns quad cane       SUBJECTIVE:   Patient stated what do I need to do.     OBJECTIVE DATA SUMMARY:   Critical Behavior:  Neurologic State: Appropriate for age, Alert  Orientation Level: Oriented X4  Cognition: Appropriate for age attention/concentration  Safety/Judgement: Insight into deficits  Functional Mobility Training:  Bed Mobility:     Supine to Sit: Contact guard assistance              Transfers:  Sit to Stand: Stand-by assistance  Stand to Sit: Stand-by assistance (decrease control)                             Balance:  Sitting: Intact  Standing: Impaired  Standing - Static: Good  Standing - Dynamic : Good  Ambulation/Gait Training:  Distance (ft): 100 Feet (ft)  Assistive Device: Gait belt;Walker, rolling  Ambulation - Level of Assistance: Stand-by assistance        Gait Abnormalities: Antalgic;Decreased step clearance  Right Side Weight Bearing: As tolerated     Base of Support: Widened  Stance: Right decreased                         Stairs:  Number of Stairs Trained: 5  Stairs - Level of Assistance: Contact guard assistance   Rail Use: Left  and quad cane    Therapeutic Exercises:     Pain Rating:  Right hip     Activity Tolerance:   Improving     After treatment patient left in no apparent distress:   Sitting in chair and Call bell within reach    COMMUNICATION/COLLABORATION:   The patients plan of care was discussed with: Registered nurse.      Martha Shepherd PTA   Time Calculation: 16 mins

## 2021-09-19 NOTE — PROGRESS NOTES
Patient being discharged to home. V/S within acceptable limits and I.V. removed. Patient educated and agreed to follow-up plan of care.

## 2021-09-19 NOTE — PROGRESS NOTES
Orthopedic Progress Note    Subjective:    Patient seen and examined. NAEO. Tried to work with PT yesterday but had some dizziness. He hopes to go home today. maylin diet. Voiding. Pain controlled  -FC,NV,CP,SOB    Objective    Vitals:    09/18/21 0929 09/18/21 1659 09/18/21 2215 09/19/21 0232   BP: 109/64 119/73 116/62 (!) 118/56   Pulse: 78 81 77 75   Resp: 14 14 16 17   Temp: 97.4 °F (36.3 °C) 98.6 °F (37 °C) 98.9 °F (37.2 °C) 98.2 °F (36.8 °C)   SpO2: 98% 97% 95% 97%   Weight:       Height:         No results found for this or any previous visit (from the past 24 hour(s)). PT: consulted  Drain: none    Gen: Franklin Araiza is a 60 y.o. male who is  A&O x 3.  NAD    Musculoskeletal:       RLE     Dressing c/d/i   Sensation intact to light touch L3-S1 nerve root dermatomes   Active dorsiflexion and plantarflexion intact   Toes up and down going    Brisk capillary refill in all toes   Compartments soft and compressible   Palpable DP/PT    Assessment:      Right JAMES 2 Days Post-Op    Plan:    Ortho admit  WBAT RLE  Ice  Pain control  Monitor labs pending this AM  PT consulted  Dispo: possibly dc today if cleared by PT        Gill Eckert DO  Orthopedic Surgery  8:14 AM9/19/2021

## 2021-09-19 NOTE — PROGRESS NOTES
Transition of Care: Discharge home today with home health - Referral sent to [address] ([phone])  Patient stated he looked in the book he received for his surgery and believes [facility] takes his insurance. Cm sent referral via [employer] to [facility] and [facility]. Cm informed patient if cm can't find a [facility] agency to accept today there will be follow up tomorrow. Home Health sent to the following agencies  1) [facility]-  does not accept insurance  2) [facility]- does not accept insurance  3) -pending acceptance  4) [facility]- pending acceptance  5) [facility]-does not accept insurance    Cm received order for rolling walker-  Cm provided rolling walker to patient. Patient signed [facility] medical form and [facility]     Reason for Admission:  Right Total Hip Arthroplasty Anterior                      RUR Score:   N/A                  Plan for utilizing home health: Referrals sent to [facility]- pending acceptance    PCP: First and Last name:  Cintia Davis MD   Are you a current patient: Yes/No: Yes   Approximate date of last visit: NA                    Current Advanced Directive/Advance Care Plan: Full Code      Healthcare Decision Maker:   Click here to complete [address] including selection of the Healthcare Decision Maker Relationship (ie \"Primary\")             Observation notice provided in writing to patient and/or caregiver as well as verbal explanation of the policy. Patients who are in outpatient status also receive the Observation notice. Patient has received notice and or patient representative has received via secure email, fax, or certified mail based on patient representative's preference. The Plan for Transition of Care is related to the following treatment goals: PT/OT    The Patient was provided with a choice of provider and agrees   with the discharge plan.  [x] Yes [] No    Freedom of choice list was provided with basic dialogue that supports the patient's individualized plan of care/goals, treatment preferences and shares the quality data associated with the providers.  [x] Yes [] No

## 2021-09-22 NOTE — PROGRESS NOTES
09/22/21    CM received notification from Ortho PA by [employer] that this patient was discharged over the weekend and home health was not secured. Noted. The weekend CM sent several referrals and not agency accepted due to [employer]. There are very limited agencies that take this insurance. CM faxed ([phone]) all orders and clinicals to [employer] (company affiliated with [employer] in finding home health). CM also called liaison at [facility] ([phone], ext: [phone]) and left message. [employer] will follow up with the patient once home health is secured.      Claire Ruiz RN/CRM

## 2021-09-24 NOTE — PROGRESS NOTES
09/24/21    CM never received call back from [address] [place] [address] ([phone]-[phone]: [phone]) from previous message left on voicemail. CM called again today and spoke to [facility] and she stated there was no claim filed on this patient from my prior fax sent on 9/22/21 consisting of home health orders, clinicals, H & P, and face sheet. CM confirmed the fax number ([phone]) with [facility] and faxed information again. Frida stated she would look into it and would be in touch as soon as possible. CM called Dr. Eduard Riley ([phone]) and left message with status of situation with this patient.       Mariely Holliday RN/CRM

## 2021-09-26 ENCOUNTER — HOME HEALTH ADMISSION (OUTPATIENT)
Dept: HOME HEALTH SERVICES | Facility: HOME HEALTH | Age: 66
End: 2021-09-26

## 2021-09-27 NOTE — PROGRESS NOTES
9/27/21 1:21pm    CM received call back from [address] [place] [address] confirmed with the patient that he has out patient therapy established coordinated by the Orthopedics surgeon's office.

## 2021-09-27 NOTE — PROGRESS NOTES
9/27/21: 8:45am.     CM called [employer] coordinator Berta Milton ([phone]) and left message to confirm she received paperwork faxed to ([phone]). Frida called back and stated that the case was turned over to [employer]. Same number as above with ext: [phone]. Sydni Graff stated she worked on case all weekend and stated she had conversation with the patient and he is now thinking about going to outpatient therapy. If the patient decides he still wants home health, [employer] states that new orders will be needed from Ortho MD. [employer] is calling the patient back today confirming this plan. VIRGIL informed [employer] that a message was left on Friday with the surgeon's nurse Hussain Monroe about the situation.      Jackie Mckeon RN/CRM

## 2021-12-09 ENCOUNTER — OFFICE VISIT (OUTPATIENT)
Dept: ORTHOPEDIC SURGERY | Age: 66
End: 2021-12-09

## 2021-12-09 VITALS — BODY MASS INDEX: 31.14 KG/M2 | WEIGHT: 235 LBS | HEIGHT: 73 IN

## 2021-12-09 DIAGNOSIS — M47.816 LUMBAR SPONDYLOSIS: ICD-10-CM

## 2021-12-09 DIAGNOSIS — Z96.641 S/P TOTAL RIGHT HIP ARTHROPLASTY: Primary | ICD-10-CM

## 2021-12-09 PROCEDURE — 99212 OFFICE O/P EST SF 10 MIN: CPT | Performed by: ORTHOPAEDIC SURGERY

## 2021-12-09 NOTE — PROGRESS NOTES
Maurice Toro (: 1955) is a 60 y.o. male, patient, here for evaluation of the following chief complaint(s):  Hip Pain (post operative follow up right - 2021)       SUBJECTIVE/OBJECTIVE:  Maurice Toro presents today for postop visit following right total hip. Doing great with his hip but still has severe low back pain. Is been 3 years since his last lumbar MRI. He starting to have some pain that radiates into his right buttock and all the way down to his right foot. It is not relieved by his medications including. PHYSICAL EXAM:  Vitals: Ht 6' 1\" (1.854 m)   Wt 235 lb (106.6 kg)   BMI 31.00 kg/m²   Body mass index is 31 kg/m². 60y.o. year old M in no acute distress. Right hip incision well-healed. Walks very hunched forward due to the low back issues that he is having. IMAGING:  Radiographs: NoneXR Results (maximum last 3): Results from Appointment encounter on 21    XR HIP RT W OR WO PELV 2-3 VWS    Narrative  3 views right total hip. AP pelvis, AP have bilateral hip. Hip replacement is well fixed no issues. Results from [place] encounter on 21    XR HIP RT DURING OR PROC    Narrative  EXAM: XR HIP RT DURING OR PROC    INDICATION: Intra-Op. COMPARISON: None    FINDINGS: Intraoperative fluoroscopic views of the right hip demonstrate  satisfactory prosthesis alignment. Please refer to the operative report for  additional details. Impression  Right hip procedure in progress. INTERPRETATION PROVIDED FOR COMPLIANCE ONLY AT NO CHARGE      ASSESSMENT/PLAN:  1. S/P total right hip arthroplasty  -     XR HIP RT W OR WO PELV 2-3 VWS; Future  2. Lumbar spondylosis      I would like to schedule an MRI of the patient's lumbar spine as he is having radicular complaints that been present for a long period of time. His old MRI is over 3years old. He is a candidate for an injection should the MRI show advancing's lumbar degeneration.     Return in about 9 months (around 9/9/2022). Review Of Systems     Patient denies any recent fever, chills, nausea, vomiting, chest pain, or shortness of breath. No Known Allergies    Current Outpatient Medications   Medication Sig    aspirin delayed-release 81 mg tablet Take 81 mg by mouth daily.  clopidogreL (Plavix) 75 mg tab Take  by mouth.  omega 3-dha-epa-fish oil (Fish OiL) 100-160-1,000 mg cap Take  by mouth.  GARLIC OIL PO Take  by mouth.  acetaminophen (Tylenol Arthritis Pain) 650 mg TbER Take 650 mg by mouth every eight (8) hours.  LORazepam (ATIVAN) 0.5 mg tablet Take  by mouth.  famotidine (PEPCID) 10 mg tablet Take 10 mg by mouth two (2) times a day.  hydroCHLOROthiazide (MICROZIDE) 12.5 mg capsule Take 12.5 mg by mouth daily.  cholecalciferol (Vitamin D3) 25 mcg (1,000 unit) cap Take  by mouth daily.  multivitamin (ONE A DAY) tablet Take 1 Tablet by mouth daily.  metaxalone (SKELAXIN) 400 mg tablet Take  by mouth.  amLODIPine-Olmesartan 10-40 mg tab Take 1 Tablet by mouth daily.  metoprolol succinate (TOPROL-XL) 100 mg tablet Take 100 mg by mouth daily.  rosuvastatin (Crestor) 40 mg tablet Take 40 mg by mouth nightly.  senna-docusate (PERICOLACE) 8.6-50 mg per tablet Take 1 Tablet by mouth daily. Indications: opioid induced constipation (Patient not taking: Reported on 12/9/2021)    naloxone [facility]) 4 mg/actuation nasal spray Use 1 spray intranasally, then discard. Repeat with new spray every 2 min as needed for opioid overdose symptoms, alternating nostrils. Indications: decrease in rate & depth of breathing due to opioid drug, opioid overdose (Patient not taking: Reported on 12/9/2021)     No current facility-administered medications for this visit.        Past Medical History:   Diagnosis Date    Adverse effect of anesthesia     DJD    CAD (coronary artery disease) 2000 and 2020    Pt reports  2 HA        Past Surgical History:   Procedure Laterality Date    CA CARDIAC SURG PROCEDURE UNLIST      2       History reviewed. No pertinent family history. Social History     Socioeconomic History    Marital status:      Spouse name: Not on file    Number of children: Not on file    Years of education: Not on file    Highest education level: Not on file   Occupational History    Not on file   Tobacco Use    Smoking status: Never Smoker    Smokeless tobacco: Never Used   Substance and Sexual Activity    Alcohol use: Yes    Drug use: Never    Sexual activity: Not on file   Other Topics Concern    Not on file   Social History Narrative    Not on file     Social Determinants of Health     Financial Resource Strain:     Difficulty of Paying Living Expenses: Not on file   Food Insecurity:     Worried About Running Out of Food in the Last Year: Not on file    Madeline of Food in the Last Year: Not on file   Transportation Needs:     Lack of Transportation (Medical): Not on file    Lack of Transportation (Non-Medical):  Not on file   Physical Activity:     Days of Exercise per Week: Not on file    Minutes of Exercise per Session: Not on file   Stress:     Feeling of Stress : Not on file   Social Connections:     Frequency of Communication with Friends and Family: Not on file    Frequency of Social Gatherings with Friends and Family: Not on file    Attends Restoration Services: Not on file    Active Member of [employer] Group or Organizations: Not on file    Attends Club or Organization Meetings: Not on file    Marital Status: Not on file   Intimate Partner Violence:     Fear of Current or Ex-Partner: Not on file    Emotionally Abused: Not on file    Physically Abused: Not on file    Sexually Abused: Not on file   Housing Stability:     Unable to Pay for Housing in the Last Year: Not on file    Number of [place] in the Last Year: Not on file    Unstable Housing in the Last Year: Not on file         Orders Placed This Encounter    XR HIP RT W OR WO PELV 2-3 VWS Standing Status:   Future     Number of Occurrences:   1     Standing Expiration Date:   12/10/2022        An electronic signature was used to authenticate this note.   -- Vidal Nunez MD

## 2022-03-19 PROBLEM — M16.11 PRIMARY LOCALIZED OSTEOARTHRITIS OF RIGHT HIP: Status: ACTIVE | Noted: 2021-09-17

## 2023-05-14 RX ORDER — LORAZEPAM 0.5 MG/1
TABLET ORAL
COMMUNITY

## 2023-05-14 RX ORDER — ROSUVASTATIN CALCIUM 40 MG/1
40 TABLET, COATED ORAL NIGHTLY
COMMUNITY

## 2023-05-14 RX ORDER — CLOPIDOGREL BISULFATE 75 MG/1
TABLET ORAL
COMMUNITY

## 2023-05-14 RX ORDER — METOPROLOL SUCCINATE 100 MG/1
100 TABLET, EXTENDED RELEASE ORAL DAILY
COMMUNITY

## 2023-05-14 RX ORDER — AMOXICILLIN 250 MG
1 CAPSULE ORAL DAILY
COMMUNITY
Start: 2021-09-17

## 2023-05-14 RX ORDER — ASPIRIN 81 MG/1
81 TABLET ORAL DAILY
COMMUNITY

## 2023-05-14 RX ORDER — METAXALONE 400 MG/1
TABLET ORAL
COMMUNITY

## 2023-05-14 RX ORDER — SENNOSIDES 8.6 MG
650 CAPSULE ORAL EVERY 8 HOURS
COMMUNITY

## 2023-05-14 RX ORDER — AMLODIPINE AND OLMESARTAN MEDOXOMIL 10; 40 MG/1; MG/1
1 TABLET ORAL DAILY
COMMUNITY

## 2023-05-14 RX ORDER — FAMOTIDINE 10 MG
10 TABLET ORAL 2 TIMES DAILY
COMMUNITY

## 2023-05-14 RX ORDER — NALOXONE HYDROCHLORIDE 4 MG/.1ML
SPRAY NASAL
COMMUNITY
Start: 2021-09-17

## 2023-05-14 RX ORDER — HYDROCHLOROTHIAZIDE 12.5 MG/1
12.5 CAPSULE, GELATIN COATED ORAL DAILY
COMMUNITY

## (undated) DEVICE — GLOVE SURG SZ 65 L12IN FNGR THK79MIL GRN LTX FREE

## (undated) DEVICE — GLOVE ORTHO 8   MSG9480

## (undated) DEVICE — CONTAINER,SPECIMEN,3OZ,OR STRL: Brand: MEDLINE

## (undated) DEVICE — PINNACLE CANCELLOUS BONE SCREW 6.5MM X 35MM
Type: IMPLANTABLE DEVICE | Site: HIP | Status: NON-FUNCTIONAL
Brand: PINNACLE
Removed: 2021-09-17

## (undated) DEVICE — SOLUTION SURG PREP 26 CC PURPREP

## (undated) DEVICE — BLADE SAW W073XL276IN THK0031IN CUT THK0036IN REPL SAG

## (undated) DEVICE — GLOVE SURG SZ 65 L12IN FNGR THK94MIL STD WHT LTX FREE

## (undated) DEVICE — SUTURE VCRL SZ 2-0 L27IN ABSRB UD L26MM SH 1/2 CIR J417H

## (undated) DEVICE — SUTURE ABSORBABLE BRAIDED 2-0 CT-1 27 IN UD VICRYL J259H

## (undated) DEVICE — 4-PORT MANIFOLD: Brand: NEPTUNE 2

## (undated) DEVICE — GLOVE SURG SZ 8 L12IN FNGR THK94MIL STD WHT LTX FREE

## (undated) DEVICE — TOTAL JOINT - SMH: Brand: MEDLINE INDUSTRIES, INC.

## (undated) DEVICE — SOLUTION IRRIG 3000ML 0.9% SOD CHL USP UROMATIC PLAS CONT

## (undated) DEVICE — SUTURE MCRYL SZ 2-0 L36IN ABSRB UD L36MM CT-1 1/2 CIR Y945H

## (undated) DEVICE — C-ARM: Brand: UNBRANDED

## (undated) DEVICE — YANKAUER,TAPERED BULBOUS TIP,W/O VENT: Brand: MEDLINE

## (undated) DEVICE — COVER LT HNDL PLAS RIG 1 PER PK

## (undated) DEVICE — PADDING CAST SPEC 6INX4YD COT --

## (undated) DEVICE — SUTURE VCRL SZ 2 L54IN ABSRB UD L65MM TP-1 1/2 CIR J880T

## (undated) DEVICE — SCRUB DRY SURG EZ SCRUB BRUSH PREOPERATIVE GRN

## (undated) DEVICE — SUTURE MCRYL SZ 4 0 L18IN ABSRB VLT PS 1 L24MM 3 8 CIR REV Y682H

## (undated) DEVICE — 6619 2 PTNT ISO SYS INCISE AREA&LT;(&GT;&&LT;)&GT;P: Brand: STERI-DRAPE™ IOBAN™ 2

## (undated) DEVICE — DRAPE,U/ SHT,SPLIT,PLAS,STERIL: Brand: MEDLINE

## (undated) DEVICE — T4 HOOD

## (undated) DEVICE — PINNACLE CANCELLOUS BONE SCREW 6.5MM X 40MM
Type: IMPLANTABLE DEVICE | Site: HIP | Status: NON-FUNCTIONAL
Brand: PINNACLE
Removed: 2021-09-17

## (undated) DEVICE — ADHESIVE SKIN CLOSURE 4X22 CM PREMIERPRO EXOFINFUSION DISP

## (undated) DEVICE — SYR 20ML LL STRL LF --

## (undated) DEVICE — SOLUTION IRRIG 1000ML STRL H2O USP PLAS POUR BTL

## (undated) DEVICE — NEEDLE HYPO 18GA L1.5IN PNK S STL HUB POLYPR SHLD REG BVL